# Patient Record
Sex: FEMALE | Race: WHITE | HISPANIC OR LATINO | Employment: FULL TIME | URBAN - METROPOLITAN AREA
[De-identification: names, ages, dates, MRNs, and addresses within clinical notes are randomized per-mention and may not be internally consistent; named-entity substitution may affect disease eponyms.]

---

## 2017-01-18 ENCOUNTER — OFFICE VISIT (OUTPATIENT)
Dept: FAMILY MEDICINE CLINIC | Facility: CLINIC | Age: 52
End: 2017-01-18

## 2017-01-18 VITALS
OXYGEN SATURATION: 98 % | SYSTOLIC BLOOD PRESSURE: 118 MMHG | TEMPERATURE: 98.2 F | HEIGHT: 59 IN | WEIGHT: 139.1 LBS | HEART RATE: 73 BPM | RESPIRATION RATE: 16 BRPM | DIASTOLIC BLOOD PRESSURE: 72 MMHG | BODY MASS INDEX: 28.04 KG/M2

## 2017-01-18 DIAGNOSIS — K52.9 GASTROENTERITIS: ICD-10-CM

## 2017-01-18 DIAGNOSIS — R19.7 DIARRHEA, UNSPECIFIED TYPE: Primary | ICD-10-CM

## 2017-01-18 DIAGNOSIS — N76.1 SUBACUTE VAGINITIS: ICD-10-CM

## 2017-01-18 LAB
BILIRUB BLD-MCNC: NEGATIVE MG/DL
CLARITY, POC: CLEAR
COLOR UR: YELLOW
GLUCOSE UR STRIP-MCNC: NEGATIVE MG/DL
KETONES UR QL: NEGATIVE
LEUKOCYTE EST, POC: NEGATIVE
NITRITE UR-MCNC: NEGATIVE MG/ML
PH UR: 5 [PH] (ref 5–8)
PROT UR STRIP-MCNC: NEGATIVE MG/DL
RBC # UR STRIP: NEGATIVE /UL
SP GR UR: 1.03 (ref 1–1.03)
UROBILINOGEN UR QL: NORMAL

## 2017-01-18 PROCEDURE — 81003 URINALYSIS AUTO W/O SCOPE: CPT | Performed by: FAMILY MEDICINE

## 2017-01-18 PROCEDURE — 99214 OFFICE O/P EST MOD 30 MIN: CPT | Performed by: FAMILY MEDICINE

## 2017-01-18 NOTE — MR AVS SNAPSHOT
Khushboo Freeman   2017 1:30 PM   Office Visit    Dept Phone:  [phone]   Encounter #:  [id number]    Provider:  Ernesto Barcenas MD   Department:  [facility]IMARY [facility]                Your Full Care Plan              Your Updated Medication List          This list is accurate as of: 17  1:56 PM.  Always use your most recent med list.                estradiol 0.1 MG/GM vaginal cream   Commonly known as:  ESTRACE   Insert 2 g into the vagina daily. 1/2 gram every other day               We Performed the Following     Ambulatory Referral to Gastroenterology     Ambulatory Referral to Obstetrics / Gynecology     Clostridium Difficile EIA     Giardia / Cryptosporidium Screen     POC Urinalysis Dipstick, Automated       You Were Diagnosed With        Codes Comments    Diarrhea, unspecified type    -  Primary ICD-10-CM: R19.7  ICD-9-CM: 787.91     Subacute vaginitis     ICD-10-CM: N76.1  ICD-9-CM: 616.10     Gastroenteritis     ICD-10-CM: K52.9  ICD-9-CM: 558.9       Instructions     None    Patient Instructions History      Upcoming Appointments     Visit Type Date Time Department    SAME DAY 2017  1:30 PM [facility] Signup     [facility] [employer] allows you to send messages to your doctor, view your test results, renew your prescriptions, schedule appointments, and more. To sign up, go to [facility] and click on the Sign Up Now link in the New User? box. Enter your [employer] Activation Code exactly as it appears below along with the last four digits of your Social Security Number and your Date of Birth () to complete the sign-up process. If you do not sign up before the expiration date, you must request a new code.    [employer] Activation Code: [email or web address]  Expires: 2017  1:54 PM    If you have questions, you can email [email or web address] or call [phone] to talk to our [employer] staff. Remember,  "MyChart is NOT to be used for urgent needs. For medical emergencies, dial 911.               Other Info from Your Visit           Allergies     Oxycodone-acetaminophen  Hives, Itching      Reason for Visit     Diarrhea     Headache     Vomiting           Vital Signs     Blood Pressure Pulse Temperature Respirations Height Weight    118/72 73 98.2 °F (36.8 °C) (Oral) 16 59\" (149.9 cm) 139 lb 1.6 oz (63.1 kg)    Last Menstrual Period Oxygen Saturation Body Mass Index Smoking Status          (LMP Unknown) 98% 28.09 kg/m2 Never Smoker        Problems and Diagnoses Noted     Diarrhea    -  Primary    Subacute vaginitis        Gastroenteritis          Results     POC Urinalysis Dipstick, Automated      Component Value Standard Range & Units    Color Yellow Yellow, Straw, Dark Yellow, Kiana    Clarity, UA Clear Clear    Glucose, UA Negative Negative, 1000 mg/dL (3+) mg/dL    Bilirubin Negative Negative    Ketones, UA Negative Negative    Specific Gravity  1.030 1.005 - 1.030    Blood, UA Negative Negative    pH, Urine 5.0 5.0 - 8.0    Protein, POC Negative Negative mg/dL    Urobilinogen, UA Normal Normal    Leukocytes Negative Negative    Nitrite, UA Negative Negative                    "

## 2017-01-18 NOTE — PROGRESS NOTES
"Subjective   Khushboo Freeman is a 51 y.o. female.     Chief Complaint   Patient presents with   • Diarrhea   • Headache   • Vomiting        History of Present Illness    This week headache, nausea vomiting, and diarrhea.  Little bit better today.  Questionable mucus and possibly blood in the stool.  However she's had intermittent diarrhea for the better part of 6 months.  She went to [place] this summer.  Came back with diarrhea.  She had a negative stool culture negative stool test for ova and parasite.  Then she went to [place].  She's had intermittent loose stools since then.  Worse last week or 2.  In December she went to her clinic and had Augmentin for a sinus infection.  The causing diarrhea also.  The diarrhea is bothersome occurs frequently and has a foul smell.    She's also complaining of increased stress level.  Related to stressors at work.  She also has some right lower back pain.  She is wondering if she has a kidney infection.  She's had some darker urine but no dysuria.  No urinary frequency or burning.  No recent fever.  Headache is better.      The following portions of the patient's history were reviewed and updated as appropriate: allergies, current medications, past family history, past medical history, past social history, past surgical history and problem list.          Review of Systems   Constitutional: Positive for fatigue. Negative for fever.   Respiratory: Negative.    Cardiovascular: Negative.    Gastrointestinal: Positive for blood in stool, constipation ( Intermittent constipation in the past), diarrhea, nausea and vomiting.   Genitourinary: Negative for difficulty urinating, dysuria, flank pain and hematuria.   Musculoskeletal: Positive for back pain.   Neurological: Positive for headaches.   Psychiatric/Behavioral: The patient is nervous/anxious.        Objective   Blood pressure 118/72, pulse 73, temperature 98.2 °F (36.8 °C), temperature source Oral, resp. rate 16, height 59\" " (149.9 cm), weight 139 lb 1.6 oz (63.1 kg), SpO2 98 %, not currently breastfeeding.  Physical Exam   Constitutional: She appears well-developed and well-nourished. No distress.   Eyes: Conjunctivae are normal. No scleral icterus.   Neck: Normal range of motion. Neck supple. No thyromegaly present.   Cardiovascular: Normal rate, regular rhythm, normal heart sounds and intact distal pulses.    Pulmonary/Chest: Effort normal and breath sounds normal.   Abdominal: Soft. Bowel sounds are normal. She exhibits no distension and no mass. There is tenderness (minimal suprapubic tenderness). There is no rebound and no guarding. No hernia.   Musculoskeletal: Normal range of motion. She exhibits no edema.   Skin: Skin is warm and dry.   Psychiatric: She has a normal mood and affect. Her behavior is normal. Judgment and thought content normal.   Nursing note and vitals reviewed.          Assessment/Plan   Khushboo was seen today for diarrhea, headache and vomiting.    Diagnoses and all orders for this visit:    Diarrhea, unspecified type  -     POC Urinalysis Dipstick, Automated  -     Ambulatory Referral to Gastroenterology  -     Clostridium Difficile EIA  -     Giardia / Cryptosporidium Screen    Subacute vaginitis  -     POC Urinalysis Dipstick, Automated  -     Ambulatory Referral to Obstetrics / Gynecology  -     Clostridium Difficile EIA    Gastroenteritis    Chronic diarrhea with acute exacerbation.  High probability of irritable bowel syndrome with acute viral gastroenteritis.  However other causes need to be excluded before this diagnosis can be made.      She's had loose stool symptoms since traveling to [place] this summer.  When she first got back from [place] the stool culture and O&P test was negative.  But then she went to [place].  Most recently exacerbation of the symptoms, but more suggestive of viral gastroenteritis.  Given the recent antibiotic use for a reported sinus infection, I'm recommending stool  for C. difficile.  Also checking stool for Giardia and cryptosporidium although less likely.  Urinalysis today was unremarkable.  I'm referring her to to a gastroenterologist.  She had a previous appointment that she had canceled.  She is overdue for colonoscopy, last was 7 years ago.  She will see her primary care doctor for follow-up as needed.    Intermittent vaginitis-like symptoms.  History of hysterectomy.  Patient like referral to gynecologist.  This was made.

## 2017-01-25 LAB
C DIFF TOX A+B STL QL IA: NEGATIVE
CRYPTOSP AG STL QL IA: NEGATIVE
G LAMBLIA AG STL QL IA: NEGATIVE

## 2017-02-03 ENCOUNTER — OFFICE VISIT (OUTPATIENT)
Dept: GASTROENTEROLOGY | Facility: CLINIC | Age: 52
End: 2017-02-03

## 2017-02-03 VITALS
DIASTOLIC BLOOD PRESSURE: 72 MMHG | SYSTOLIC BLOOD PRESSURE: 118 MMHG | WEIGHT: 136 LBS | HEIGHT: 60 IN | BODY MASS INDEX: 26.7 KG/M2

## 2017-02-03 DIAGNOSIS — K59.1 FUNCTIONAL DIARRHEA: Primary | ICD-10-CM

## 2017-02-03 DIAGNOSIS — R14.0 BLOATING: ICD-10-CM

## 2017-02-03 DIAGNOSIS — R10.84 GENERALIZED ABDOMINAL PAIN: ICD-10-CM

## 2017-02-03 PROCEDURE — 99204 OFFICE O/P NEW MOD 45 MIN: CPT | Performed by: INTERNAL MEDICINE

## 2017-02-03 RX ORDER — SODIUM CHLORIDE 0.9 % (FLUSH) 0.9 %
1-10 SYRINGE (ML) INJECTION AS NEEDED
Status: CANCELLED | OUTPATIENT
Start: 2017-02-03

## 2017-02-03 RX ORDER — SODIUM CHLORIDE, SODIUM LACTATE, POTASSIUM CHLORIDE, CALCIUM CHLORIDE 600; 310; 30; 20 MG/100ML; MG/100ML; MG/100ML; MG/100ML
30 INJECTION, SOLUTION INTRAVENOUS CONTINUOUS
Status: CANCELLED | OUTPATIENT
Start: 2017-02-03

## 2017-02-03 RX ORDER — HYOSCYAMINE SULFATE 0.125 MG
0.12 TABLET ORAL EVERY 6 HOURS PRN
Qty: 120 TABLET | Refills: 2 | Status: SHIPPED | OUTPATIENT
Start: 2017-02-03 | End: 2017-03-24 | Stop reason: ALTCHOICE

## 2017-02-03 NOTE — PROGRESS NOTES
Chief Complaint   Patient presents with   • Diarrhea       Subjective     HPI    Khushboo Freeman is a 51 y.o. female with a past medical history noted below who presents for evaluation of diarrhea.  Symptoms for a number of years but worsening for about a year and a half.  At first, was intermittent with diarrhea and constipation.  She would control symptoms by eating dairy (lactose intolerant) for constipation and taking iron pills to stop her up for diarrhea.  She thinks that symptoms started following a kidney infection.  Symptoms have progressed and now she describes diarrhea ocurring about 4 days per week.  The other days she has one formed stool per day.  On the diarrhea days she will have about 3-5 stools.  She will have urgency and cramping along with pressure in her R flank.  She has adjusted her diet extensively but has found no triggers.  In fact her mother is in town and has been doing most of the cooking, which has been more healthy, and she has had no change in her symptoms.  In the morning, if she doesn't eat in the morning or eats oily and greasy food she will get severe abdominal pain, cramping, located in the bilateral lower quadrants, doubling her over.  She also endorses frequent bloating symptoms.  She does endorse that stressful situations make her diarrhea worse.    She has no nausea or vomiting.  Sour taste in her mouth regularly.  Review of past notes indicates normal stool studies.       She is s/p cholecystectomy.      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      Colonoscopy 2005-- for problems with her GI tract, she thinks for a different issue.  Reviewed pathology-- normal colonic biopsies, gastritis but negative H pylori.    Paternal uncle with colon cancer.  No other GI malignancies in the family.  No smoking and no drinking.  Works for [employer] in the [employer].      Past Medical History   Diagnosis Date   • Anemia    • Fecal incontinence    • Kidney  infection 2013     Hospitalized   • Uterine fibroid          Current Outpatient Prescriptions:   •  estradiol (ESTRACE) 0.1 MG/GM vaginal cream, Insert 2 g into the vagina daily. 1/2 gram every other day, Disp: 42 g, Rfl: 12  •  IRON, FERROUS GLUCONATE, PO, Take  by mouth., Disp: , Rfl:   •  hyoscyamine (ANASPAZ,LEVSIN) 0.125 MG tablet, Take 1 tablet by mouth Every 6 (Six) Hours As Needed for cramping or diarrhea., Disp: 120 tablet, Rfl: 2    Allergies   Allergen Reactions   • Oxycodone-Acetaminophen Hives and Itching       Social History     Social History   • Marital status:      Spouse name: N/A   • Number of children: 3   • Years of education: N/A     Occupational History   • instructor      Social History Main Topics   • Smoking status: Never Smoker   • Smokeless tobacco: Never Used   • Alcohol use No   • Drug use: No   • Sexual activity: Yes     Partners: Male     Birth control/ protection: None     Other Topics Concern   • Not on file     Social History Narrative    OB/GYN patient since 2009       Family History   Problem Relation Age of Onset   • Hypertension Mother    • Thyroid disease Mother    • Osteoporosis Mother    • Cancer Father      kidney   • Colon cancer Paternal Uncle        Review of Systems   Constitutional: Negative for activity change, appetite change and fatigue.   HENT: Negative for sore throat and trouble swallowing.    Eyes: Negative.    Respiratory: Negative.    Cardiovascular: Negative.    Gastrointestinal: Positive for diarrhea. Negative for abdominal distention, abdominal pain, blood in stool, nausea, rectal pain and vomiting.   Endocrine: Negative for cold intolerance and heat intolerance.   Genitourinary: Negative for difficulty urinating, dysuria and frequency.        Frequent yeast infections   Musculoskeletal: Negative for arthralgias, back pain and myalgias.   Skin: Negative.    Hematological: Negative for adenopathy. Does not bruise/bleed easily.    Psychiatric/Behavioral: The patient is nervous/anxious.    All other systems reviewed and are negative.      Objective     Vitals:    02/03/17 1435   BP: 118/72       Physical Exam   Constitutional: She is oriented to person, place, and time. She appears well-developed and well-nourished. No distress.   HENT:   Head: Normocephalic and atraumatic.   Right Ear: External ear normal.   Left Ear: External ear normal.   Nose: Nose normal.   Mouth/Throat: Oropharynx is clear and moist.   Eyes: Conjunctivae and EOM are normal. Right eye exhibits no discharge. Left eye exhibits no discharge. No scleral icterus.   Neck: Normal range of motion. Neck supple. No thyromegaly present.   No supraclavicular adenopathy   Cardiovascular: Normal rate, regular rhythm, normal heart sounds and intact distal pulses.  Exam reveals no gallop.    No murmur heard.  No lower extremity edema   Pulmonary/Chest: Effort normal and breath sounds normal. No stridor. No respiratory distress. She has no wheezes.   Abdominal: Soft. Normal appearance and bowel sounds are normal. She exhibits no distension and no mass. There is no hepatosplenomegaly. There is tenderness. There is no rigidity, no rebound and no guarding. No hernia.   Diffusely tender to palpation   Genitourinary:   Genitourinary Comments: Rectal exam deferred   Musculoskeletal: Normal range of motion. She exhibits no edema or tenderness.   No atrophy of upper or lower extremities.  Normal digits and nails of both hands.   Lymphadenopathy:     She has no cervical adenopathy.   Neurological: She is alert and oriented to person, place, and time. She displays no atrophy. Coordination normal.   Skin: Skin is warm and dry. No rash noted. She is not diaphoretic. No erythema.   Psychiatric: She has a normal mood and affect. Her behavior is normal. Judgment and thought content normal.   Nursing note and vitals reviewed.      WBC   Date Value Ref Range Status   08/03/2015 3.26 (L) 4.50 - 10.70  K/Cumm Final     RBC   Date Value Ref Range Status   08/03/2015 4.92 3.90 - 5.20 Million Final     HEMOGLOBIN   Date Value Ref Range Status   08/03/2015 15.3 11.9 - 15.5 g/dL Final     HEMATOCRIT   Date Value Ref Range Status   08/03/2015 44.6 35.6 - 45.5 % Final     MCV   Date Value Ref Range Status   08/03/2015 90.7 80.5 - 98.2 fL Final     MCH   Date Value Ref Range Status   08/03/2015 31.1 26.9 - 32.0 pg Final     MCHC   Date Value Ref Range Status   08/03/2015 34.3 32.4 - 36.3 g/dL Final     RDW   Date Value Ref Range Status   08/03/2015 12.4 11.7 - 13.0 % Final     PLATELETS   Date Value Ref Range Status   08/03/2015 148 140 - 500 K/Cumm Final     NEUTROPHIL REL %   Date Value Ref Range Status   08/03/2015 83.4 (H) 42.7 - 76.0 % Final     LYMPHOCYTE REL %   Date Value Ref Range Status   08/03/2015 12.9 (L) 19.6 - 45.3 % Final     MONOCYTE REL %   Date Value Ref Range Status   08/03/2015 3.4 (L) 5.0 - 12.0 % Final     EOSINOPHIL REL %   Date Value Ref Range Status   08/03/2015 0.0 (L) 0.3 - 6.2 % Final     BASOPHIL REL %   Date Value Ref Range Status   08/03/2015 0.3 0.0 - 1.5 % Final     NEUTROPHILS ABSOLUTE   Date Value Ref Range Status   08/03/2015 2.7 1.9 - 8.1 K/Cumm Final     LYMPHOCYTES ABSOLUTE   Date Value Ref Range Status   08/03/2015 0.4 (L) 0.9 - 4.8 K/Cumm Final     MONOCYTES ABSOLUTE   Date Value Ref Range Status   08/03/2015 0.1 (L) 0.2 - 1.2 K/Cumm Final     EOSINOPHILS ABSOLUTE   Date Value Ref Range Status   08/03/2015 0.0 0.0 - 0.7 K/Cumm Final     BASOPHILS ABSOLUTE   Date Value Ref Range Status   08/03/2015 0.0 0.0 - 0.2 K/Cumm Final       GLUCOSE   Date Value Ref Range Status   08/03/2015 119 (H) 65 - 99 mg/dL Final     SODIUM   Date Value Ref Range Status   08/03/2015 139 136 - 145 mmol/L Final     POTASSIUM   Date Value Ref Range Status   08/03/2015 3.4 (L) 3.5 - 5.2 mmol/L Final     CO2   Date Value Ref Range Status   08/03/2015 28 22 - 29 mmol/L Final     CHLORIDE   Date Value Ref  Range Status   08/03/2015 97 (L) 98 - 107 mmol/L Final     CREATININE   Date Value Ref Range Status   08/03/2015 0.69 0.57 - 1.00 mg/dL Final     BUN   Date Value Ref Range Status   08/03/2015 9 6 - 20 mg/dL Final     CALCIUM   Date Value Ref Range Status   08/03/2015 10.0 8.6 - 10.5 mg/dL Final     ALKALINE PHOSPHATASE   Date Value Ref Range Status   08/03/2015 151 (H) 39 - 117 U/L Final     TOTAL PROTEIN   Date Value Ref Range Status   08/03/2015 8.3 6.0 - 8.5 g/dL Final     ALT (SGPT)   Date Value Ref Range Status   08/03/2015 109 (H) 5 - 33 U/L Final     AST (SGOT)   Date Value Ref Range Status   08/03/2015 137 (H) 5 - 32 U/L Final     TOTAL BILIRUBIN   Date Value Ref Range Status   08/03/2015 1.0 0.1 - 1.2 mg/dL Final     ALBUMIN   Date Value Ref Range Status   08/03/2015 4.7 3.5 - 5.2 g/dL Final         Imaging Results (last 7 days)     ** No results found for the last 168 hours. **            No notes on file    Assessment/Plan      1)diarrhea: acute on chronic problem for her.  She has had chronic issues for a number of years suggestive of irritable bowel.  However she does have intermittent flares of worsening symptoms and is currently in the middle of a long-standing flare of symptoms.  She did have a colonoscopy in 2005 with normal biopsies.  Stool studies for an infectious cause by her primary care physician were negative    2)abdominal pain: another chronic issue for her.  It is associated with the diarrhea.  She notes that it is worse with stressful situations    3)bloating: associated with above    Plan:  Overall, her constellation of symptoms are suggestive of a long-standing bowel syndrome however she does endorse recent worsening of her symptoms.  Given that she is age 51 and her last colonoscopy was approximately 11 years ago she certainly is due for colonoscopy for colorectal cancer screening.    -to schedule colonoscopy for further evaluation of diarrhea and also for colorectal cancer  screening  -Trial of Levsin for pain and diarrhea  -I will check H. Pylori antibodies as well as a CBC and CMP on her today as it appears that she has not had recent labs and she does have a history of anemia    Khushboo was seen today for diarrhea.    Diagnoses and all orders for this visit:    Functional diarrhea  -     Case Request; Standing  -     Implement Anesthesia Orders Day of Procedure; Standing  -     Obtain Informed Consent; Standing  -     Verify bowel prep was successful; Standing  -     Insert Peripheral IV; Standing  -     Saline Lock & Maintain IV Access; Standing  -     sodium chloride 0.9 % flush 1-10 mL; Infuse 1-10 mL into a venous catheter As Needed for line care.  -     lactated ringers infusion; Infuse 30 mL/hr into a venous catheter Continuous.  -     Case Request  -     hyoscyamine (ANASPAZ,LEVSIN) 0.125 MG tablet; Take 1 tablet by mouth Every 6 (Six) Hours As Needed for cramping or diarrhea.  -     H. Pylori Antibody, IgA  -     H. Pylori Antibody, IgG  -     CBC & Differential  -     Comprehensive Metabolic Panel    Generalized abdominal pain  -     Case Request; Standing  -     Implement Anesthesia Orders Day of Procedure; Standing  -     Obtain Informed Consent; Standing  -     Verify bowel prep was successful; Standing  -     Insert Peripheral IV; Standing  -     Saline Lock & Maintain IV Access; Standing  -     sodium chloride 0.9 % flush 1-10 mL; Infuse 1-10 mL into a venous catheter As Needed for line care.  -     lactated ringers infusion; Infuse 30 mL/hr into a venous catheter Continuous.  -     Case Request  -     hyoscyamine (ANASPAZ,LEVSIN) 0.125 MG tablet; Take 1 tablet by mouth Every 6 (Six) Hours As Needed for cramping or diarrhea.  -     H. Pylori Antibody, IgA  -     H. Pylori Antibody, IgG  -     CBC & Differential  -     Comprehensive Metabolic Panel    Bloating        I have discussed the above plan with the patient.  They verbalize understanding and are in agreement  with the plan.  They have been advised to contact the office for any questions, concerns, or changes related to their health.    EMR Dragon/Transcription disclaimer:       Much of this encounter note is an electronic transcription/translation of spoken language to printed text. The electronic translation of spoken language may permit erroneous, or at times, nonsensical words or phrases to be inadvertently transcribed; Although I have reviewed the note for such errors, some may still exist.

## 2017-02-03 NOTE — PATIENT INSTRUCTIONS
Labs today, will call with results    Schedule the colonoscopy    Start the levsin to control the diarrhea; take as needed    For any additional questions, concerns or changes to your condition after today's office visit please contact the office at [phone].

## 2017-02-07 ENCOUNTER — TELEPHONE (OUTPATIENT)
Dept: GASTROENTEROLOGY | Facility: CLINIC | Age: 52
End: 2017-02-07

## 2017-02-07 DIAGNOSIS — R74.8 ELEVATED LIVER ENZYMES: Primary | ICD-10-CM

## 2017-02-07 LAB
ALBUMIN SERPL-MCNC: 4.8 G/DL (ref 3.5–5.2)
ALBUMIN/GLOB SERPL: 1.6 G/DL
ALP SERPL-CCNC: 135 U/L (ref 39–117)
ALT SERPL-CCNC: 56 U/L (ref 1–33)
AST SERPL-CCNC: 51 U/L (ref 1–32)
BASOPHILS # BLD AUTO: 0.01 10*3/MM3 (ref 0–0.2)
BASOPHILS NFR BLD AUTO: 0.2 % (ref 0–1.5)
BILIRUB SERPL-MCNC: 0.5 MG/DL (ref 0.1–1.2)
BUN SERPL-MCNC: 16 MG/DL (ref 6–20)
BUN/CREAT SERPL: 23.5 (ref 7–25)
CALCIUM SERPL-MCNC: 9.9 MG/DL (ref 8.6–10.5)
CHLORIDE SERPL-SCNC: 102 MMOL/L (ref 98–107)
CO2 SERPL-SCNC: 27.2 MMOL/L (ref 22–29)
CREAT SERPL-MCNC: 0.68 MG/DL (ref 0.57–1)
EOSINOPHIL # BLD AUTO: 0.11 10*3/MM3 (ref 0–0.7)
EOSINOPHIL NFR BLD AUTO: 1.8 % (ref 0.3–6.2)
ERYTHROCYTE [DISTWIDTH] IN BLOOD BY AUTOMATED COUNT: 12.7 % (ref 11.7–13)
GLOBULIN SER CALC-MCNC: 3 GM/DL
GLUCOSE SERPL-MCNC: 96 MG/DL (ref 65–99)
H PYLORI IGA SER-ACNC: <9 UNITS (ref 0–8.9)
H PYLORI IGG SER IA-ACNC: <0.9 U/ML (ref 0–0.8)
HCT VFR BLD AUTO: 43.5 % (ref 35.6–45.5)
HGB BLD-MCNC: 14.3 G/DL (ref 11.9–15.5)
IMM GRANULOCYTES # BLD: 0 10*3/MM3 (ref 0–0.03)
IMM GRANULOCYTES NFR BLD: 0 % (ref 0–0.5)
LYMPHOCYTES # BLD AUTO: 2.38 10*3/MM3 (ref 0.9–4.8)
LYMPHOCYTES NFR BLD AUTO: 38.4 % (ref 19.6–45.3)
MCH RBC QN AUTO: 31.4 PG (ref 26.9–32)
MCHC RBC AUTO-ENTMCNC: 32.9 G/DL (ref 32.4–36.3)
MCV RBC AUTO: 95.6 FL (ref 80.5–98.2)
MONOCYTES # BLD AUTO: 0.37 10*3/MM3 (ref 0.2–1.2)
MONOCYTES NFR BLD AUTO: 6 % (ref 5–12)
NEUTROPHILS # BLD AUTO: 3.32 10*3/MM3 (ref 1.9–8.1)
NEUTROPHILS NFR BLD AUTO: 53.6 % (ref 42.7–76)
PLATELET # BLD AUTO: 222 10*3/MM3 (ref 140–500)
POTASSIUM SERPL-SCNC: 4.1 MMOL/L (ref 3.5–5.2)
PROT SERPL-MCNC: 7.8 G/DL (ref 6–8.5)
RBC # BLD AUTO: 4.55 10*6/MM3 (ref 3.9–5.2)
SODIUM SERPL-SCNC: 144 MMOL/L (ref 136–145)
WBC # BLD AUTO: 6.19 10*3/MM3 (ref 4.5–10.7)

## 2017-02-07 NOTE — PROGRESS NOTES
Her liver tests, ALP, ALT, and AST remain elevated.  I have ordered a liver ultrasound for further evaluation.  Please make sure she has follow up with me in April, thanks

## 2017-02-07 NOTE — TELEPHONE ENCOUNTER
----- Message from Jammie Collado MD sent at 2/7/2017 12:20 PM EST -----  Her liver tests, ALP, ALT, and AST remain elevated.  I have ordered a liver ultrasound for further evaluation.  Please make sure she has follow up with me in April, thanks

## 2017-02-07 NOTE — TELEPHONE ENCOUNTER
Called pt and advised per Dr Collado that her liver tests remain elevated.  She has ordered a liver u/s for further eval.  Advised her to f/u with Dr Collado in April.    Pt made f/u for 03/21/2017 at 230pm for Dr Collado.

## 2017-02-26 ENCOUNTER — HOSPITAL ENCOUNTER (OUTPATIENT)
Dept: ULTRASOUND IMAGING | Facility: HOSPITAL | Age: 52
Discharge: HOME | End: 2017-02-26
Attending: INTERNAL MEDICINE | Admitting: INTERNAL MEDICINE

## 2017-02-26 DIAGNOSIS — R74.8 ELEVATED LIVER ENZYMES: ICD-10-CM

## 2017-02-28 ENCOUNTER — TELEPHONE (OUTPATIENT)
Dept: GASTROENTEROLOGY | Facility: CLINIC | Age: 52
End: 2017-02-28

## 2017-02-28 NOTE — TELEPHONE ENCOUNTER
Call to pt.  Advise per Dr Collado that liver USN with evidence of fatty liver.  Pt asking what could cause this.  Advise that ETOH consumption, diet and exercise contributing factors.  Pt states does not drink.  Pt states will discuss further with Dr Collado tomorrow at c/s.

## 2017-02-28 NOTE — TELEPHONE ENCOUNTER
----- Message from Jammie Collado MD sent at 2/27/2017  8:55 PM EST -----  Liver USN with evidence of fatty liver

## 2017-03-01 ENCOUNTER — ANESTHESIA (OUTPATIENT)
Dept: GASTROENTEROLOGY | Facility: HOSPITAL | Age: 52
End: 2017-03-01

## 2017-03-01 ENCOUNTER — ANESTHESIA EVENT (OUTPATIENT)
Dept: GASTROENTEROLOGY | Facility: HOSPITAL | Age: 52
End: 2017-03-01

## 2017-03-01 ENCOUNTER — HOSPITAL ENCOUNTER (OUTPATIENT)
Facility: HOSPITAL | Age: 52
Setting detail: HOSPITAL OUTPATIENT SURGERY
Discharge: HOME | End: 2017-03-01
Attending: INTERNAL MEDICINE | Admitting: INTERNAL MEDICINE

## 2017-03-01 VITALS
OXYGEN SATURATION: 99 % | HEART RATE: 69 BPM | RESPIRATION RATE: 16 BRPM | WEIGHT: 132.13 LBS | HEIGHT: 60 IN | TEMPERATURE: 98.5 F | SYSTOLIC BLOOD PRESSURE: 116 MMHG | DIASTOLIC BLOOD PRESSURE: 40 MMHG | BODY MASS INDEX: 25.94 KG/M2

## 2017-03-01 DIAGNOSIS — K59.1 FUNCTIONAL DIARRHEA: ICD-10-CM

## 2017-03-01 DIAGNOSIS — R10.84 GENERALIZED ABDOMINAL PAIN: ICD-10-CM

## 2017-03-01 PROCEDURE — 88305 TISSUE EXAM BY PATHOLOGIST: CPT | Performed by: INTERNAL MEDICINE

## 2017-03-01 PROCEDURE — 45380 COLONOSCOPY AND BIOPSY: CPT | Performed by: INTERNAL MEDICINE

## 2017-03-01 PROCEDURE — 25010000002 PROPOFOL 10 MG/ML EMULSION: Performed by: ANESTHESIOLOGY

## 2017-03-01 RX ORDER — PROPOFOL 10 MG/ML
VIAL (ML) INTRAVENOUS AS NEEDED
Status: DISCONTINUED | OUTPATIENT
Start: 2017-03-01 | End: 2017-03-01 | Stop reason: SURG

## 2017-03-01 RX ORDER — PROPOFOL 10 MG/ML
VIAL (ML) INTRAVENOUS CONTINUOUS PRN
Status: DISCONTINUED | OUTPATIENT
Start: 2017-03-01 | End: 2017-03-01 | Stop reason: SURG

## 2017-03-01 RX ORDER — SODIUM CHLORIDE 0.9 % (FLUSH) 0.9 %
1-10 SYRINGE (ML) INJECTION AS NEEDED
Status: DISCONTINUED | OUTPATIENT
Start: 2017-03-01 | End: 2017-03-01 | Stop reason: HOSPADM

## 2017-03-01 RX ORDER — SODIUM CHLORIDE, SODIUM LACTATE, POTASSIUM CHLORIDE, CALCIUM CHLORIDE 600; 310; 30; 20 MG/100ML; MG/100ML; MG/100ML; MG/100ML
30 INJECTION, SOLUTION INTRAVENOUS CONTINUOUS
Status: DISCONTINUED | OUTPATIENT
Start: 2017-03-01 | End: 2017-03-01 | Stop reason: HOSPADM

## 2017-03-01 RX ORDER — LIDOCAINE HYDROCHLORIDE 20 MG/ML
INJECTION, SOLUTION INFILTRATION; PERINEURAL AS NEEDED
Status: DISCONTINUED | OUTPATIENT
Start: 2017-03-01 | End: 2017-03-01 | Stop reason: SURG

## 2017-03-01 RX ADMIN — PROPOFOL 125 MCG/KG/MIN: 10 INJECTION, EMULSION INTRAVENOUS at 09:03

## 2017-03-01 RX ADMIN — SODIUM CHLORIDE, POTASSIUM CHLORIDE, SODIUM LACTATE AND CALCIUM CHLORIDE 30 ML/HR: 600; 310; 30; 20 INJECTION, SOLUTION INTRAVENOUS at 08:50

## 2017-03-01 RX ADMIN — LIDOCAINE HYDROCHLORIDE 50 MG: 20 INJECTION, SOLUTION INFILTRATION; PERINEURAL at 09:01

## 2017-03-01 RX ADMIN — PROPOFOL 75 MG: 10 INJECTION, EMULSION INTRAVENOUS at 09:02

## 2017-03-01 NOTE — H&P (VIEW-ONLY)
Chief Complaint   Patient presents with   • Diarrhea       Subjective     HPI    Khushboo Freeman is a 51 y.o. female with a past medical history noted below who presents for evaluation of diarrhea.  Symptoms for a number of years but worsening for about a year and a half.  At first, was intermittent with diarrhea and constipation.  She would control symptoms by eating dairy (lactose intolerant) for constipation and taking iron pills to stop her up for diarrhea.  She thinks that symptoms started following a kidney infection.  Symptoms have progressed and now she describes diarrhea ocurring about 4 days per week.  The other days she has one formed stool per day.  On the diarrhea days she will have about 3-5 stools.  She will have urgency and cramping along with pressure in her R flank.  She has adjusted her diet extensively but has found no triggers.  In fact her mother is in town and has been doing most of the cooking, which has been more healthy, and she has had no change in her symptoms.  In the morning, if she doesn't eat in the morning or eats oily and greasy food she will get severe abdominal pain, cramping, located in the bilateral lower quadrants, doubling her over.  She also endorses frequent bloating symptoms.  She does endorse that stressful situations make her diarrhea worse.    She has no nausea or vomiting.  Sour taste in her mouth regularly.  Review of past notes indicates normal stool studies.       She is s/p cholecystectomy.      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      Colonoscopy 2005-- for problems with her GI tract, she thinks for a different issue.  Reviewed pathology-- normal colonic biopsies, gastritis but negative H pylori.    Paternal uncle with colon cancer.  No other GI malignancies in the family.  No smoking and no drinking.  Works for [employer] in the [employer].      Past Medical History   Diagnosis Date   • Anemia    • Fecal incontinence    • Kidney  infection 2013     Hospitalized   • Uterine fibroid          Current Outpatient Prescriptions:   •  estradiol (ESTRACE) 0.1 MG/GM vaginal cream, Insert 2 g into the vagina daily. 1/2 gram every other day, Disp: 42 g, Rfl: 12  •  IRON, FERROUS GLUCONATE, PO, Take  by mouth., Disp: , Rfl:   •  hyoscyamine (ANASPAZ,LEVSIN) 0.125 MG tablet, Take 1 tablet by mouth Every 6 (Six) Hours As Needed for cramping or diarrhea., Disp: 120 tablet, Rfl: 2    Allergies   Allergen Reactions   • Oxycodone-Acetaminophen Hives and Itching       Social History     Social History   • Marital status:      Spouse name: N/A   • Number of children: 3   • Years of education: N/A     Occupational History   • instructor      Social History Main Topics   • Smoking status: Never Smoker   • Smokeless tobacco: Never Used   • Alcohol use No   • Drug use: No   • Sexual activity: Yes     Partners: Male     Birth control/ protection: None     Other Topics Concern   • Not on file     Social History Narrative    OB/GYN patient since 2009       Family History   Problem Relation Age of Onset   • Hypertension Mother    • Thyroid disease Mother    • Osteoporosis Mother    • Cancer Father      kidney   • Colon cancer Paternal Uncle        Review of Systems   Constitutional: Negative for activity change, appetite change and fatigue.   HENT: Negative for sore throat and trouble swallowing.    Eyes: Negative.    Respiratory: Negative.    Cardiovascular: Negative.    Gastrointestinal: Positive for diarrhea. Negative for abdominal distention, abdominal pain, blood in stool, nausea, rectal pain and vomiting.   Endocrine: Negative for cold intolerance and heat intolerance.   Genitourinary: Negative for difficulty urinating, dysuria and frequency.        Frequent yeast infections   Musculoskeletal: Negative for arthralgias, back pain and myalgias.   Skin: Negative.    Hematological: Negative for adenopathy. Does not bruise/bleed easily.    Psychiatric/Behavioral: The patient is nervous/anxious.    All other systems reviewed and are negative.      Objective     Vitals:    02/03/17 1435   BP: 118/72       Physical Exam   Constitutional: She is oriented to person, place, and time. She appears well-developed and well-nourished. No distress.   HENT:   Head: Normocephalic and atraumatic.   Right Ear: External ear normal.   Left Ear: External ear normal.   Nose: Nose normal.   Mouth/Throat: Oropharynx is clear and moist.   Eyes: Conjunctivae and EOM are normal. Right eye exhibits no discharge. Left eye exhibits no discharge. No scleral icterus.   Neck: Normal range of motion. Neck supple. No thyromegaly present.   No supraclavicular adenopathy   Cardiovascular: Normal rate, regular rhythm, normal heart sounds and intact distal pulses.  Exam reveals no gallop.    No murmur heard.  No lower extremity edema   Pulmonary/Chest: Effort normal and breath sounds normal. No stridor. No respiratory distress. She has no wheezes.   Abdominal: Soft. Normal appearance and bowel sounds are normal. She exhibits no distension and no mass. There is no hepatosplenomegaly. There is tenderness. There is no rigidity, no rebound and no guarding. No hernia.   Diffusely tender to palpation   Genitourinary:   Genitourinary Comments: Rectal exam deferred   Musculoskeletal: Normal range of motion. She exhibits no edema or tenderness.   No atrophy of upper or lower extremities.  Normal digits and nails of both hands.   Lymphadenopathy:     She has no cervical adenopathy.   Neurological: She is alert and oriented to person, place, and time. She displays no atrophy. Coordination normal.   Skin: Skin is warm and dry. No rash noted. She is not diaphoretic. No erythema.   Psychiatric: She has a normal mood and affect. Her behavior is normal. Judgment and thought content normal.   Nursing note and vitals reviewed.      WBC   Date Value Ref Range Status   08/03/2015 3.26 (L) 4.50 - 10.70  K/Cumm Final     RBC   Date Value Ref Range Status   08/03/2015 4.92 3.90 - 5.20 Million Final     HEMOGLOBIN   Date Value Ref Range Status   08/03/2015 15.3 11.9 - 15.5 g/dL Final     HEMATOCRIT   Date Value Ref Range Status   08/03/2015 44.6 35.6 - 45.5 % Final     MCV   Date Value Ref Range Status   08/03/2015 90.7 80.5 - 98.2 fL Final     MCH   Date Value Ref Range Status   08/03/2015 31.1 26.9 - 32.0 pg Final     MCHC   Date Value Ref Range Status   08/03/2015 34.3 32.4 - 36.3 g/dL Final     RDW   Date Value Ref Range Status   08/03/2015 12.4 11.7 - 13.0 % Final     PLATELETS   Date Value Ref Range Status   08/03/2015 148 140 - 500 K/Cumm Final     NEUTROPHIL REL %   Date Value Ref Range Status   08/03/2015 83.4 (H) 42.7 - 76.0 % Final     LYMPHOCYTE REL %   Date Value Ref Range Status   08/03/2015 12.9 (L) 19.6 - 45.3 % Final     MONOCYTE REL %   Date Value Ref Range Status   08/03/2015 3.4 (L) 5.0 - 12.0 % Final     EOSINOPHIL REL %   Date Value Ref Range Status   08/03/2015 0.0 (L) 0.3 - 6.2 % Final     BASOPHIL REL %   Date Value Ref Range Status   08/03/2015 0.3 0.0 - 1.5 % Final     NEUTROPHILS ABSOLUTE   Date Value Ref Range Status   08/03/2015 2.7 1.9 - 8.1 K/Cumm Final     LYMPHOCYTES ABSOLUTE   Date Value Ref Range Status   08/03/2015 0.4 (L) 0.9 - 4.8 K/Cumm Final     MONOCYTES ABSOLUTE   Date Value Ref Range Status   08/03/2015 0.1 (L) 0.2 - 1.2 K/Cumm Final     EOSINOPHILS ABSOLUTE   Date Value Ref Range Status   08/03/2015 0.0 0.0 - 0.7 K/Cumm Final     BASOPHILS ABSOLUTE   Date Value Ref Range Status   08/03/2015 0.0 0.0 - 0.2 K/Cumm Final       GLUCOSE   Date Value Ref Range Status   08/03/2015 119 (H) 65 - 99 mg/dL Final     SODIUM   Date Value Ref Range Status   08/03/2015 139 136 - 145 mmol/L Final     POTASSIUM   Date Value Ref Range Status   08/03/2015 3.4 (L) 3.5 - 5.2 mmol/L Final     CO2   Date Value Ref Range Status   08/03/2015 28 22 - 29 mmol/L Final     CHLORIDE   Date Value Ref  Range Status   08/03/2015 97 (L) 98 - 107 mmol/L Final     CREATININE   Date Value Ref Range Status   08/03/2015 0.69 0.57 - 1.00 mg/dL Final     BUN   Date Value Ref Range Status   08/03/2015 9 6 - 20 mg/dL Final     CALCIUM   Date Value Ref Range Status   08/03/2015 10.0 8.6 - 10.5 mg/dL Final     ALKALINE PHOSPHATASE   Date Value Ref Range Status   08/03/2015 151 (H) 39 - 117 U/L Final     TOTAL PROTEIN   Date Value Ref Range Status   08/03/2015 8.3 6.0 - 8.5 g/dL Final     ALT (SGPT)   Date Value Ref Range Status   08/03/2015 109 (H) 5 - 33 U/L Final     AST (SGOT)   Date Value Ref Range Status   08/03/2015 137 (H) 5 - 32 U/L Final     TOTAL BILIRUBIN   Date Value Ref Range Status   08/03/2015 1.0 0.1 - 1.2 mg/dL Final     ALBUMIN   Date Value Ref Range Status   08/03/2015 4.7 3.5 - 5.2 g/dL Final         Imaging Results (last 7 days)     ** No results found for the last 168 hours. **            No notes on file    Assessment/Plan      1)diarrhea: acute on chronic problem for her.  She has had chronic issues for a number of years suggestive of irritable bowel.  However she does have intermittent flares of worsening symptoms and is currently in the middle of a long-standing flare of symptoms.  She did have a colonoscopy in 2005 with normal biopsies.  Stool studies for an infectious cause by her primary care physician were negative    2)abdominal pain: another chronic issue for her.  It is associated with the diarrhea.  She notes that it is worse with stressful situations    3)bloating: associated with above    Plan:  Overall, her constellation of symptoms are suggestive of a long-standing bowel syndrome however she does endorse recent worsening of her symptoms.  Given that she is age 51 and her last colonoscopy was approximately 11 years ago she certainly is due for colonoscopy for colorectal cancer screening.    -to schedule colonoscopy for further evaluation of diarrhea and also for colorectal cancer  screening  -Trial of Levsin for pain and diarrhea  -I will check H. Pylori antibodies as well as a CBC and CMP on her today as it appears that she has not had recent labs and she does have a history of anemia    Khushboo was seen today for diarrhea.    Diagnoses and all orders for this visit:    Functional diarrhea  -     Case Request; Standing  -     Implement Anesthesia Orders Day of Procedure; Standing  -     Obtain Informed Consent; Standing  -     Verify bowel prep was successful; Standing  -     Insert Peripheral IV; Standing  -     Saline Lock & Maintain IV Access; Standing  -     sodium chloride 0.9 % flush 1-10 mL; Infuse 1-10 mL into a venous catheter As Needed for line care.  -     lactated ringers infusion; Infuse 30 mL/hr into a venous catheter Continuous.  -     Case Request  -     hyoscyamine (ANASPAZ,LEVSIN) 0.125 MG tablet; Take 1 tablet by mouth Every 6 (Six) Hours As Needed for cramping or diarrhea.  -     H. Pylori Antibody, IgA  -     H. Pylori Antibody, IgG  -     CBC & Differential  -     Comprehensive Metabolic Panel    Generalized abdominal pain  -     Case Request; Standing  -     Implement Anesthesia Orders Day of Procedure; Standing  -     Obtain Informed Consent; Standing  -     Verify bowel prep was successful; Standing  -     Insert Peripheral IV; Standing  -     Saline Lock & Maintain IV Access; Standing  -     sodium chloride 0.9 % flush 1-10 mL; Infuse 1-10 mL into a venous catheter As Needed for line care.  -     lactated ringers infusion; Infuse 30 mL/hr into a venous catheter Continuous.  -     Case Request  -     hyoscyamine (ANASPAZ,LEVSIN) 0.125 MG tablet; Take 1 tablet by mouth Every 6 (Six) Hours As Needed for cramping or diarrhea.  -     H. Pylori Antibody, IgA  -     H. Pylori Antibody, IgG  -     CBC & Differential  -     Comprehensive Metabolic Panel    Bloating        I have discussed the above plan with the patient.  They verbalize understanding and are in agreement  with the plan.  They have been advised to contact the office for any questions, concerns, or changes related to their health.    EMR Dragon/Transcription disclaimer:       Much of this encounter note is an electronic transcription/translation of spoken language to printed text. The electronic translation of spoken language may permit erroneous, or at times, nonsensical words or phrases to be inadvertently transcribed; Although I have reviewed the note for such errors, some may still exist.

## 2017-03-01 NOTE — PLAN OF CARE
Problem: Patient Care Overview (Adult)  Goal: Plan of Care Review  Outcome: Ongoing (interventions implemented as appropriate)    03/01/17 0830   Coping/Psychosocial Response Interventions   Plan Of Care Reviewed With patient   Patient Care Overview   Progress improving       Goal: Adult Individualization and Mutuality  Outcome: Ongoing (interventions implemented as appropriate)    03/01/17 0830   Individualization   Patient Specific Preferences darcy       Goal: Discharge Needs Assessment    03/01/17 0830   Discharge Needs Assessment   Concerns To Be Addressed no discharge needs identified   Discharge Disposition home or self-care   Living Environment   Transportation Available car;family or friend will provide         Problem: GI Endoscopy (Adult)  Intervention: Monitor/Manage Procedure Recovery    03/01/17 0830   Respiratory Interventions   Airway/Ventilation Management airway patency maintained   Coping/Psychosocial Interventions   Environmental Support calm environment promoted   Activity   Activity Type activity adjusted per tolerance   Cardiac Interventions   Warming Thermoregulation Maintenance warm blankets applied       Intervention: Prevent Kandice-procedural Injury    03/01/17 0830   Positioning   Positioning side lying, left   Head Of Bed (HOB) Position HOB elevated         Goal: Signs and Symptoms of Listed Potential Problems Will be Absent or Manageable (GI Endoscopy)  Outcome: Ongoing (interventions implemented as appropriate)    03/01/17 0830   GI Endoscopy   Problems Assessed (GI Endoscopy) all   Problems Present (GI Endoscopy) none

## 2017-03-01 NOTE — ANESTHESIA PREPROCEDURE EVALUATION
Anesthesia Evaluation     Patient summary reviewed   NPO Status: > 8 hours   Airway   no difficulty expected  Dental      Pulmonary    Cardiovascular     Rhythm: regular        Neuro/Psych  GI/Hepatic/Renal/Endo      Musculoskeletal     Abdominal    Substance History      OB/GYN          Other                                    Anesthesia Plan    ASA 2     MAC     intravenous induction   Anesthetic plan and risks discussed with patient.

## 2017-03-02 LAB
CYTO UR: NORMAL
LAB AP CASE REPORT: NORMAL
Lab: NORMAL
PATH REPORT.FINAL DX SPEC: NORMAL
PATH REPORT.GROSS SPEC: NORMAL

## 2017-03-05 NOTE — PROGRESS NOTES
Colon path: some hyperplastic tissue in the right colon, benign; normal tissue in the remainder of the colon.  In the absence of symptoms, her next screening colonoscopy is due in 10 years.    Pls place in 10 year recall.      We can see how she is doing with her diarrhea at her upcoming visit, thanks

## 2017-03-08 ENCOUNTER — TELEPHONE (OUTPATIENT)
Dept: GASTROENTEROLOGY | Facility: CLINIC | Age: 52
End: 2017-03-08

## 2017-03-08 NOTE — TELEPHONE ENCOUNTER
Call to pt.  Advise per DR Collado that colon path: some hyperplastic tissue in the right colon, benign; normal tissue in the remainder of the colon.  In the absence of symptoms, next screening c/s is due in 10 yrs.     We can see how she is doing with her diarrhea at her upcoming visit.  Pt verb understanding and states will begin taking hyoscyamine as prescribed.    Message to Lindsey ABDALLA for 3/2/27 c/s recall.

## 2017-03-08 NOTE — TELEPHONE ENCOUNTER
----- Message from Jammie Collado MD sent at 3/5/2017 12:19 PM EST -----  Colon path: some hyperplastic tissue in the right colon, benign; normal tissue in the remainder of the colon.  In the absence of symptoms, her next screening colonoscopy is due in 10 years.    Pls place in 10 year recall.      We can see how she is doing with her diarrhea at her upcoming visit, thanks

## 2017-03-24 ENCOUNTER — OFFICE VISIT (OUTPATIENT)
Dept: GASTROENTEROLOGY | Facility: CLINIC | Age: 52
End: 2017-03-24

## 2017-03-24 VITALS
HEIGHT: 60 IN | DIASTOLIC BLOOD PRESSURE: 78 MMHG | BODY MASS INDEX: 26.31 KG/M2 | WEIGHT: 134 LBS | SYSTOLIC BLOOD PRESSURE: 118 MMHG

## 2017-03-24 DIAGNOSIS — R10.31 RIGHT LOWER QUADRANT ABDOMINAL PAIN: ICD-10-CM

## 2017-03-24 DIAGNOSIS — K59.1 FUNCTIONAL DIARRHEA: Primary | ICD-10-CM

## 2017-03-24 DIAGNOSIS — K76.0 FATTY LIVER: ICD-10-CM

## 2017-03-24 PROCEDURE — 99214 OFFICE O/P EST MOD 30 MIN: CPT | Performed by: INTERNAL MEDICINE

## 2017-03-24 RX ORDER — DICYCLOMINE HCL 20 MG
20 TABLET ORAL EVERY 6 HOURS PRN
Qty: 90 TABLET | Refills: 2 | Status: SHIPPED | OUTPATIENT
Start: 2017-03-24 | End: 2017-04-27

## 2017-03-24 NOTE — PROGRESS NOTES
Chief Complaint   Patient presents with   • Follow-up     from scope       Subjective     HPI    Khushboo Freeman is a 51 y.o. female who presents for follow-up of diarrhea and abdominal pain diarrhea.  Symptoms have been chronic with recent worsening over the past year and a half.  Colonoscopy  3/1/17-- normal biopsies without evidence of microscopic colitis.  There was some hyperplastic tissue in the biopsy specimens, however no distinct polyp was identified..  SHe says that the Levsin has improved her symptoms.  However, she was only able to get a small amount of that due to her insurance not completely covering it.  She had been taking half a tablet every day.  She complains of R sided adominal pain preceding BMs. Resolves after stooling.   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  She will have to firmly apply pressure with her hand to the painful area.  He also finds that more fibrous foods, such as green beans, lettuce is, and beans give her more significant issues.  She does try to avoid those foods.      Past Medical History:   Diagnosis Date   • Anemia    • Fatty liver    • Fecal incontinence    • History of transfusion    • Kidney infection 2013    Hospitalized   • PONV (postoperative nausea and vomiting)    • Uterine fibroid          Current Outpatient Prescriptions:   •  estradiol (ESTRACE) 0.1 MG/GM vaginal cream, Insert 2 g into the vagina daily. 1/2 gram every other day, Disp: 42 g, Rfl: 12  •  dicyclomine (BENTYL) 20 MG tablet, Take 1 tablet by mouth Every 6 (Six) Hours As Needed (cramping)., Disp: 90 tablet, Rfl: 2  •  IRON, FERROUS GLUCONATE, PO, Take  by mouth., Disp: , Rfl:     Allergies   Allergen Reactions   • Oxycodone-Acetaminophen Hives and Itching   • Penicillins        Social History     Social History   • Marital status:      Spouse name: N/A   • Number of children: 3   • Years  of education: N/A     Occupational History   • instructor      Social History Main Topics   • Smoking status: Never Smoker   • Smokeless tobacco: Never Used   • Alcohol use No   • Drug use: No   • Sexual activity: Yes     Partners: Male     Birth control/ protection: None     Other Topics Concern   • Not on file     Social History Narrative    OB/GYN patient since 2009       Family History   Problem Relation Age of Onset   • Hypertension Mother    • Thyroid disease Mother    • Osteoporosis Mother    • Cancer Father      kidney   • Colon cancer Paternal Uncle        Review of Systems   Constitutional: Negative for activity change, appetite change and fatigue.   HENT: Negative for sore throat and trouble swallowing.    Eyes: Negative.    Respiratory: Negative.    Cardiovascular: Negative.    Gastrointestinal: Positive for diarrhea. Negative for abdominal distention, abdominal pain, blood in stool, nausea, rectal pain and vomiting.   Endocrine: Negative for cold intolerance and heat intolerance.   Genitourinary: Negative for difficulty urinating, dysuria and frequency.        Frequent yeast infections   Musculoskeletal: Negative for arthralgias, back pain and myalgias.   Skin: Negative.    Hematological: Negative for adenopathy. Does not bruise/bleed easily.   Psychiatric/Behavioral: The patient is nervous/anxious.    All other systems reviewed and are negative.      Objective     Vitals:    03/24/17 1432   BP: 118/78       Physical Exam   Constitutional: She is oriented to person, place, and time. She appears well-developed and well-nourished. No distress.   HENT:   Head: Normocephalic and atraumatic.   Right Ear: External ear normal.   Left Ear: External ear normal.   Nose: Nose normal.   Mouth/Throat: Oropharynx is clear and moist.   Eyes: Conjunctivae and EOM are normal. Right eye exhibits no discharge. Left eye exhibits no discharge. No scleral icterus.   Neck: Normal range of motion. Neck supple. No thyromegaly  present.   No supraclavicular adenopathy   Cardiovascular: Normal rate, regular rhythm, normal heart sounds and intact distal pulses.  Exam reveals no gallop.    No murmur heard.  No lower extremity edema   Pulmonary/Chest: Effort normal and breath sounds normal. No stridor. No respiratory distress. She has no wheezes.   Abdominal: Soft. Normal appearance and bowel sounds are normal. She exhibits no distension and no mass. There is no hepatosplenomegaly. There is tenderness. There is no rigidity, no rebound and no guarding. No hernia.   Diffusely tender to palpation   Genitourinary:   Genitourinary Comments: Rectal exam deferred   Musculoskeletal: Normal range of motion. She exhibits no edema or tenderness.   No atrophy of upper or lower extremities.  Normal digits and nails of both hands.   Lymphadenopathy:     She has no cervical adenopathy.   Neurological: She is alert and oriented to person, place, and time. She displays no atrophy. Coordination normal.   Skin: Skin is warm and dry. No rash noted. She is not diaphoretic. No erythema.   Psychiatric: She has a normal mood and affect. Her behavior is normal. Judgment and thought content normal.   Nursing note and vitals reviewed.      WBC   Date Value Ref Range Status   02/03/2017 6.19 4.50 - 10.70 10*3/mm3 Final     RBC   Date Value Ref Range Status   02/03/2017 4.55 3.90 - 5.20 10*6/mm3 Final     Hemoglobin   Date Value Ref Range Status   02/03/2017 14.3 11.9 - 15.5 g/dL Final     Hematocrit   Date Value Ref Range Status   02/03/2017 43.5 35.6 - 45.5 % Final     MCV   Date Value Ref Range Status   02/03/2017 95.6 80.5 - 98.2 fL Final     MCH   Date Value Ref Range Status   02/03/2017 31.4 26.9 - 32.0 pg Final     MCHC   Date Value Ref Range Status   02/03/2017 32.9 32.4 - 36.3 g/dL Final     RDW   Date Value Ref Range Status   02/03/2017 12.7 11.7 - 13.0 % Final     Platelets   Date Value Ref Range Status   02/03/2017 222 140 - 500 10*3/mm3 Final     Neutrophil  Rel %   Date Value Ref Range Status   02/03/2017 53.6 42.7 - 76.0 % Final     Lymphocyte Rel %   Date Value Ref Range Status   02/03/2017 38.4 19.6 - 45.3 % Final     Monocyte Rel %   Date Value Ref Range Status   02/03/2017 6.0 5.0 - 12.0 % Final     Eosinophil Rel %   Date Value Ref Range Status   02/03/2017 1.8 0.3 - 6.2 % Final     Basophil Rel %   Date Value Ref Range Status   02/03/2017 0.2 0.0 - 1.5 % Final     Neutrophils Absolute   Date Value Ref Range Status   02/03/2017 3.32 1.90 - 8.10 10*3/mm3 Final     Lymphocytes Absolute   Date Value Ref Range Status   02/03/2017 2.38 0.90 - 4.80 10*3/mm3 Final     Monocytes Absolute   Date Value Ref Range Status   02/03/2017 0.37 0.20 - 1.20 10*3/mm3 Final     Eosinophils Absolute   Date Value Ref Range Status   02/03/2017 0.11 0.00 - 0.70 10*3/mm3 Final     Basophils Absolute   Date Value Ref Range Status   02/03/2017 0.01 0.00 - 0.20 10*3/mm3 Final       Glucose   Date Value Ref Range Status   08/03/2015 119 (H) 65 - 99 mg/dL Final     Sodium   Date Value Ref Range Status   02/03/2017 144 136 - 145 mmol/L Final     Potassium   Date Value Ref Range Status   02/03/2017 4.1 3.5 - 5.2 mmol/L Final     Total CO2   Date Value Ref Range Status   02/03/2017 27.2 22.0 - 29.0 mmol/L Final     Chloride   Date Value Ref Range Status   02/03/2017 102 98 - 107 mmol/L Final     Creatinine   Date Value Ref Range Status   02/03/2017 0.68 0.57 - 1.00 mg/dL Final     BUN   Date Value Ref Range Status   02/03/2017 16 6 - 20 mg/dL Final     BUN/Creatinine Ratio   Date Value Ref Range Status   02/03/2017 23.5 7.0 - 25.0 Final     Calcium   Date Value Ref Range Status   02/03/2017 9.9 8.6 - 10.5 mg/dL Final     eGFR Non  Am   Date Value Ref Range Status   02/03/2017 91 >60 mL/min/1.73 Final     Alkaline Phosphatase   Date Value Ref Range Status   02/03/2017 135 (H) 39 - 117 U/L Final     Total Protein   Date Value Ref Range Status   08/03/2015 8.3 6.0 - 8.5 g/dL Final     ALT  (SGPT)   Date Value Ref Range Status   02/03/2017 56 (H) 1 - 33 U/L Final     AST (SGOT)   Date Value Ref Range Status   02/03/2017 51 (H) 1 - 32 U/L Final     Total Bilirubin   Date Value Ref Range Status   02/03/2017 0.5 0.1 - 1.2 mg/dL Final     Albumin   Date Value Ref Range Status   02/03/2017 4.80 3.50 - 5.20 g/dL Final     A/G Ratio   Date Value Ref Range Status   02/03/2017 1.6 g/dL Final     EXAMINATION: LIVER SONOGRAM      HISTORY: 51-year-old female with history of elevated transaminase levels      FINDINGS: Sonographic evaluation of the liver and selective structures  of the right upper quadrant was performed. The right kidney has a normal  appearance. Increased echogenicity seen throughout the liver without  evidence for focal abnormality. Liver measures on the order of 13.8 cm  in anterior to posterior dimension. The patient status post  cholecystectomy. The common bile duct measures 0.6 cm in diameter. There  is limited visualization of the pancreas, but the visualized portion of  the pancreas has normal appearance.      IMPRESSION:  1. Findings suggestive of hepatic steatosis.  2. Evidence of prior cholecystectomy.      This report was finalized on 2/27/2017 6:13 PM by Dr. Saturnino Chicas MD.        3/1/17 colon path  1. RIGHT / ASCENDING COLON:  FOCALLY HYPERPLASTIC COLONIC MUCOSA.      2. LEFT / DESCENDING COLON, RANDOM:  COLONIC MUCOSA WITH NO PATHOLOGIC DIAGNOSIS.   UNREMARKABLE MUCOSAL LYMPHOID AGGREGATE.          No notes on file    Assessment/Plan      Functional diarrhea:   -  Most consistent with diarrhea predominant IBS.  Reassuring colonoscopy with no evidence of microscopic colitis on biopsies.  Better with the levsin but her insurance is not covering    Right lower quadrant abdominal pain  -  Persist.  Resolves with bowel movements.  This is related to her need to have bowel movement.  Her schedule precludes her from being able to go to the bathroom when she needs to.    Fatty liver   -  She is working on losing weight.  Mild elevation in her transaminases.  Steatosis confirmed on imaging    Plan:  -I will change her Levsin to dicyclomine; hopefully her insurance will cover this  -Given her a note so that she may try to get to the bathroom during her work day to help with the right lower quadrant pain  -She is to adjust her diet as needed to avoid the foods that trigger more for symptoms.  Additionally I will give her FODMAPS info  -I have advised her to focus on decreasing caloric intake and increasing caloric expenditure via activity to help with weight loss for her fatty liver    Khushboo was seen today for follow-up.    Diagnoses and all orders for this visit:    Functional diarrhea  -     dicyclomine (BENTYL) 20 MG tablet; Take 1 tablet by mouth Every 6 (Six) Hours As Needed (cramping).    Right lower quadrant abdominal pain  -     dicyclomine (BENTYL) 20 MG tablet; Take 1 tablet by mouth Every 6 (Six) Hours As Needed (cramping).    Fatty liver      I have discussed the above plan with the patient.  They verbalize understanding and are in agreement with the plan.  They have been advised to contact the office for any questions, concerns, or changes related to their health.    EMR Dragon/Transcription disclaimer:       Much of this encounter note is an electronic transcription/translation of spoken language to printed text. The electronic translation of spoken language may permit erroneous, or at times, nonsensical words or phrases to be inadvertently transcribed; Although I have reviewed the note for such errors, some may still exist.

## 2017-03-24 NOTE — PATIENT INSTRUCTIONS
Use the dicyclomine for cramps and diarrhea    Review the FODMAPS diet    Goal of 5 pound weight loss over the next 6 months (focus on limiting sweets and increasing activity)    For any additional questions, concerns or changes to your condition after today's office visit please contact the office at [phone].

## 2017-04-27 ENCOUNTER — OFFICE VISIT (OUTPATIENT)
Dept: FAMILY MEDICINE CLINIC | Facility: CLINIC | Age: 52
End: 2017-04-27

## 2017-04-27 VITALS
BODY MASS INDEX: 26.07 KG/M2 | WEIGHT: 132.8 LBS | TEMPERATURE: 99 F | SYSTOLIC BLOOD PRESSURE: 105 MMHG | OXYGEN SATURATION: 98 % | DIASTOLIC BLOOD PRESSURE: 72 MMHG | HEART RATE: 67 BPM | HEIGHT: 60 IN

## 2017-04-27 DIAGNOSIS — R19.7 DIARRHEA, UNSPECIFIED TYPE: ICD-10-CM

## 2017-04-27 DIAGNOSIS — R15.9 STOOL INCONTINENCE: Primary | ICD-10-CM

## 2017-04-27 PROCEDURE — 99213 OFFICE O/P EST LOW 20 MIN: CPT | Performed by: FAMILY MEDICINE

## 2017-04-28 NOTE — PROGRESS NOTES
Subjective   Khushboo Freeman is a 51 y.o. female. CC: diarrhea    History of Present Illness   Belinda is a 51 year old female who comes in today with the complaint of persistent diarrhea.  She has had this for almost a year now.  She has been evaluated by GI and everything turned out OK there.  No blood in her stool.  No dark color.  No nausea.  She complains of abdominal bloating.  She has pain in the suprapubic area which goes to her back.  She has also seen gynecologist because of the discomfort in her genital area.  This is all felt to be secondary to menopausal changes.  Prescription for a hormone cream was given to her and she has only been using that for a few weeks.  She complains of stool incontinence.  She would like to see a dietician to see if dietary changes would help the diarrhea.        The following portions of the patient's history were reviewed and updated as appropriate: allergies, current medications, past medical history, past social history, past surgical history and problem list.    Review of Systems   Constitutional: Negative.  Negative for fatigue and unexpected weight change.   HENT: Negative.  Negative for congestion.    Respiratory: Negative.  Negative for cough, shortness of breath and wheezing.    Cardiovascular: Negative.  Negative for chest pain, palpitations and leg swelling.   Gastrointestinal: Positive for abdominal distention, abdominal pain and diarrhea. Negative for anal bleeding, blood in stool, constipation, nausea, rectal pain and vomiting.        Stool incontinence   Genitourinary: Negative.  Negative for difficulty urinating.   Musculoskeletal: Negative.  Negative for arthralgias and back pain.   Skin: Negative.  Negative for rash.   Neurological: Negative.  Negative for dizziness, light-headedness and headaches.   Psychiatric/Behavioral: Negative.  Negative for dysphoric mood and sleep disturbance. The patient is not nervous/anxious.        Objective   Physical Exam    Constitutional: She is oriented to person, place, and time. She appears well-developed and well-nourished.   Neck: Normal range of motion. Neck supple. No thyromegaly present.   Cardiovascular: Normal rate, regular rhythm and normal heart sounds.    No murmur heard.  Pulmonary/Chest: Effort normal and breath sounds normal.   Abdominal: Soft.   Neurological: She is alert and oriented to person, place, and time.   Skin: Skin is warm and dry. No rash noted.   Psychiatric: She has a normal mood and affect. Her behavior is normal.   Nursing note and vitals reviewed.      Assessment/Plan   Problems Addressed this Visit     None      Visit Diagnoses     Stool incontinence    -  Primary    Relevant Orders    Ambulatory Referral to Colorectal Surgery    Diarrhea, unspecified type        Relevant Orders    Ambulatory Referral to Nutrition Services      Order also placed for dietician.

## 2017-08-04 ENCOUNTER — OFFICE VISIT (OUTPATIENT)
Dept: GASTROENTEROLOGY | Facility: CLINIC | Age: 52
End: 2017-08-04

## 2017-08-04 VITALS
HEIGHT: 60 IN | BODY MASS INDEX: 25.76 KG/M2 | DIASTOLIC BLOOD PRESSURE: 70 MMHG | SYSTOLIC BLOOD PRESSURE: 100 MMHG | WEIGHT: 131.2 LBS

## 2017-08-04 DIAGNOSIS — K58.0 IRRITABLE BOWEL SYNDROME WITH DIARRHEA: ICD-10-CM

## 2017-08-04 DIAGNOSIS — R14.0 BLOATING: Primary | ICD-10-CM

## 2017-08-04 PROCEDURE — 99214 OFFICE O/P EST MOD 30 MIN: CPT | Performed by: INTERNAL MEDICINE

## 2017-08-04 RX ORDER — DICYCLOMINE HYDROCHLORIDE 10 MG/1
10 CAPSULE ORAL 4 TIMES DAILY PRN
Qty: 90 CAPSULE | Refills: 3 | Status: SHIPPED | OUTPATIENT
Start: 2017-08-04 | End: 2020-01-15

## 2017-08-04 NOTE — PATIENT INSTRUCTIONS
Start the dicyclomine.  Take one pill every morning and then can use every 6 hours as needed    Start a daily probiotic (culturelle or align)    Try peppermint oil on your skin or smelling it to help with abdominal cramps    For any additional questions, concerns or changes to your condition after today's office visit please contact the office at [phone].

## 2017-08-04 NOTE — PROGRESS NOTES
"Chief Complaint   Patient presents with   • Follow-up     Diarrhea x 2 days   • Abdominal Pain     RLQ     Subjective     HPI  Khushboo Freeman is a 51 y.o. female who presents for follow up of diarrhea, fatty liver, RLQ pain.  Placed on dicyclomine and asked to review FODMAPS at last visit in March.  Shortly there after she had a URI and was given antibiotics.  This gave her diarrhea for about 2 weeks.  After this, she did well with no pain or diarrhea. She was on summer break and reports a very relaxing summer and wonders if that helped her symptoms.  She did not have problems with diarrhea or abdominal pain.  However, she has been back to work for 3 days now and the RLQ has returned.  Ate zucchini bread and then felt this caused problems.  Pain feels \"deep\" R lower abdomen.  It radiates to the R side.  Having some bloating along with the pain, feels her abdomen get distended. She used to be able to rub her hand over the area to make the pain go away but now that does not work.  Her weight is stable.  She does report diarrhea stools with urgency every morning for the past few days.    No microscopic colitis on her 3/2017 colonoscopy.    Past Medical History:   Diagnosis Date   • Anemia    • Fatty liver    • Fecal incontinence    • History of transfusion    • Kidney infection 2013    Hospitalized   • PONV (postoperative nausea and vomiting)    • Uterine fibroid        Social History     Social History   • Marital status:      Spouse name: N/A   • Number of children: 3   • Years of education: N/A     Occupational History   • instructor      Social History Main Topics   • Smoking status: Never Smoker   • Smokeless tobacco: Never Used   • Alcohol use No   • Drug use: No   • Sexual activity: Yes     Partners: Male     Birth control/ protection: None     Other Topics Concern   • None     Social History Narrative    OB/GYN patient since 2009         Current Outpatient Prescriptions:   •  dicyclomine (BENTYL) " 10 MG capsule, Take 1 capsule by mouth 4 (Four) Times a Day As Needed (diarrhea, pain)., Disp: 90 capsule, Rfl: 3  •  estradiol (ESTRACE) 0.1 MG/GM vaginal cream, Insert 2 g into the vagina daily. 1/2 gram every other day, Disp: 42 g, Rfl: 12  •  IRON, FERROUS GLUCONATE, PO, Take  by mouth., Disp: , Rfl:     Review of Systems   Constitutional: Negative for activity change, appetite change and fatigue.   HENT: Negative for congestion, sore throat and trouble swallowing.    Respiratory: Negative.    Cardiovascular: Negative.    Gastrointestinal: Positive for abdominal distention, abdominal pain and diarrhea. Negative for blood in stool, nausea and vomiting.   Endocrine: Negative for cold intolerance and heat intolerance.   Genitourinary: Negative for difficulty urinating, dysuria and frequency.   Musculoskeletal: Negative for arthralgias, back pain and myalgias.   Skin: Negative.    Hematological: Negative for adenopathy. Does not bruise/bleed easily.   All other systems reviewed and are negative.      Objective   Vitals:    08/04/17 1301   BP: 100/70     Last Weight    08/04/17  1301   Weight: 131 lb 3.2 oz (59.5 kg)     Body mass index is 26.06 kg/(m^2).      Physical Exam   Constitutional: She is oriented to person, place, and time. She appears well-developed and well-nourished. No distress.   HENT:   Head: Normocephalic and atraumatic.   Right Ear: External ear normal.   Left Ear: External ear normal.   Nose: Nose normal.   Eyes: Conjunctivae and EOM are normal. No scleral icterus.   Cardiovascular: Normal rate, regular rhythm, normal heart sounds and intact distal pulses.  Exam reveals no gallop.    No murmur heard.  No lower extremity edema   Pulmonary/Chest: Effort normal and breath sounds normal. No respiratory distress. She has no wheezes.   Abdominal: Soft. Normal appearance and bowel sounds are normal. She exhibits no distension and no mass. There is no hepatosplenomegaly. There is tenderness. There is no  rigidity, no rebound and no guarding.   Mid to R lower abd   Genitourinary:   Genitourinary Comments: Rectal exam deferred   Musculoskeletal: Normal range of motion. She exhibits no edema or tenderness.   Normal digits and nails of both hands.  No atrophy or wasting of upper or lower extremeties   Neurological: She is alert and oriented to person, place, and time. She displays no atrophy. Coordination normal.   Skin: Skin is warm and dry. No rash noted. She is not diaphoretic. No erythema.   Psychiatric: She has a normal mood and affect. Her behavior is normal. Judgment and thought content normal.   Vitals reviewed.      WBC   Date Value Ref Range Status   02/03/2017 6.19 4.50 - 10.70 10*3/mm3 Final     RBC   Date Value Ref Range Status   02/03/2017 4.55 3.90 - 5.20 10*6/mm3 Final     Hemoglobin   Date Value Ref Range Status   02/03/2017 14.3 11.9 - 15.5 g/dL Final     Hematocrit   Date Value Ref Range Status   02/03/2017 43.5 35.6 - 45.5 % Final     MCV   Date Value Ref Range Status   02/03/2017 95.6 80.5 - 98.2 fL Final     MCH   Date Value Ref Range Status   02/03/2017 31.4 26.9 - 32.0 pg Final     MCHC   Date Value Ref Range Status   02/03/2017 32.9 32.4 - 36.3 g/dL Final     RDW   Date Value Ref Range Status   02/03/2017 12.7 11.7 - 13.0 % Final     Platelets   Date Value Ref Range Status   02/03/2017 222 140 - 500 10*3/mm3 Final     Neutrophil Rel %   Date Value Ref Range Status   02/03/2017 53.6 42.7 - 76.0 % Final     Lymphocyte Rel %   Date Value Ref Range Status   02/03/2017 38.4 19.6 - 45.3 % Final     Monocyte Rel %   Date Value Ref Range Status   02/03/2017 6.0 5.0 - 12.0 % Final     Eosinophil Rel %   Date Value Ref Range Status   02/03/2017 1.8 0.3 - 6.2 % Final     Basophil Rel %   Date Value Ref Range Status   02/03/2017 0.2 0.0 - 1.5 % Final     Neutrophils Absolute   Date Value Ref Range Status   02/03/2017 3.32 1.90 - 8.10 10*3/mm3 Final     Lymphocytes Absolute   Date Value Ref Range Status    02/03/2017 2.38 0.90 - 4.80 10*3/mm3 Final     Monocytes Absolute   Date Value Ref Range Status   02/03/2017 0.37 0.20 - 1.20 10*3/mm3 Final     Eosinophils Absolute   Date Value Ref Range Status   02/03/2017 0.11 0.00 - 0.70 10*3/mm3 Final     Basophils Absolute   Date Value Ref Range Status   02/03/2017 0.01 0.00 - 0.20 10*3/mm3 Final       Lab Results   Component Value Date    GLUCOSE 119 (H) 08/03/2015    BUN 16 02/03/2017    CREATININE 0.68 02/03/2017    EGFRIFNONA 91 02/03/2017    EGFRIFAFRI 110 02/03/2017    BCR 23.5 02/03/2017    CO2 27.2 02/03/2017    CALCIUM 9.9 02/03/2017    PROTENTOTREF 7.8 02/03/2017    ALBUMIN 4.80 02/03/2017    LABIL2 1.6 02/03/2017    AST 51 (H) 02/03/2017    ALT 56 (H) 02/03/2017         Imaging Results (last 7 days)     ** No results found for the last 168 hours. **            Assessment/Plan       Functional diarrhea: no issues over the summer, now has returned with the start of school.  She did not fill the dicyclomine     Right lower quadrant abdominal pain: as above but feels worse than before.  Suspect associated with IBS     Fatty liver: per imaging and with elevated transaminases in February.  BMI 26.     Plan  -will get her on the dicyclomine-- she did not fill last time because she was doing well  -to try a probiotic  -to try peppermint oil for pain to alleviate cramps  -discussed need to get her weight into the 120s for her fatty liver    Khushboo was seen today for follow-up and abdominal pain.    Diagnoses and all orders for this visit:    Bloating  -     dicyclomine (BENTYL) 10 MG capsule; Take 1 capsule by mouth 4 (Four) Times a Day As Needed (diarrhea, pain).    Irritable bowel syndrome with diarrhea  -     dicyclomine (BENTYL) 10 MG capsule; Take 1 capsule by mouth 4 (Four) Times a Day As Needed (diarrhea, pain).        Dictated utilizing Dragon dictation

## 2020-01-15 ENCOUNTER — OFFICE VISIT (OUTPATIENT)
Dept: FAMILY MEDICINE CLINIC | Facility: CLINIC | Age: 55
End: 2020-01-15

## 2020-01-15 VITALS
OXYGEN SATURATION: 96 % | BODY MASS INDEX: 26.27 KG/M2 | HEIGHT: 60 IN | DIASTOLIC BLOOD PRESSURE: 62 MMHG | TEMPERATURE: 98.5 F | HEART RATE: 74 BPM | WEIGHT: 133.8 LBS | SYSTOLIC BLOOD PRESSURE: 112 MMHG

## 2020-01-15 DIAGNOSIS — D50.0 IRON DEFICIENCY ANEMIA DUE TO CHRONIC BLOOD LOSS: ICD-10-CM

## 2020-01-15 DIAGNOSIS — Z83.49 FAMILY HISTORY OF THYROID DISEASE: ICD-10-CM

## 2020-01-15 DIAGNOSIS — Z13.820 SCREENING FOR OSTEOPOROSIS: ICD-10-CM

## 2020-01-15 DIAGNOSIS — Z13.220 SCREENING FOR LIPID DISORDERS: ICD-10-CM

## 2020-01-15 DIAGNOSIS — Z12.31 ENCOUNTER FOR SCREENING MAMMOGRAM FOR MALIGNANT NEOPLASM OF BREAST: ICD-10-CM

## 2020-01-15 DIAGNOSIS — R53.83 FATIGUE, UNSPECIFIED TYPE: ICD-10-CM

## 2020-01-15 DIAGNOSIS — L65.9 HAIR LOSS: ICD-10-CM

## 2020-01-15 DIAGNOSIS — Z00.00 ENCOUNTER FOR WELLNESS EXAMINATION: Primary | ICD-10-CM

## 2020-01-15 DIAGNOSIS — E89.40 SURGICAL MENOPAUSE: ICD-10-CM

## 2020-01-15 LAB
ALBUMIN SERPL-MCNC: 4.7 G/DL (ref 3.5–5.2)
ALBUMIN/GLOB SERPL: 1.7 G/DL
ALP SERPL-CCNC: 105 U/L (ref 39–117)
ALT SERPL-CCNC: 57 U/L (ref 1–33)
AST SERPL-CCNC: 67 U/L (ref 1–32)
BASOPHILS # BLD AUTO: 0.02 10*3/MM3 (ref 0–0.2)
BASOPHILS NFR BLD AUTO: 0.4 % (ref 0–1.5)
BILIRUB SERPL-MCNC: 0.7 MG/DL (ref 0.2–1.2)
BUN SERPL-MCNC: 10 MG/DL (ref 6–20)
BUN/CREAT SERPL: 14.3 (ref 7–25)
CALCIUM SERPL-MCNC: 9.3 MG/DL (ref 8.6–10.5)
CHLORIDE SERPL-SCNC: 102 MMOL/L (ref 98–107)
CHOLEST SERPL-MCNC: 252 MG/DL (ref 0–200)
CO2 SERPL-SCNC: 27.2 MMOL/L (ref 22–29)
CREAT SERPL-MCNC: 0.7 MG/DL (ref 0.57–1)
EOSINOPHIL # BLD AUTO: 0.09 10*3/MM3 (ref 0–0.4)
EOSINOPHIL NFR BLD AUTO: 2 % (ref 0.3–6.2)
ERYTHROCYTE [DISTWIDTH] IN BLOOD BY AUTOMATED COUNT: 12.2 % (ref 12.3–15.4)
GLOBULIN SER CALC-MCNC: 2.7 GM/DL
GLUCOSE SERPL-MCNC: 96 MG/DL (ref 65–99)
HCT VFR BLD AUTO: 42 % (ref 34–46.6)
HDLC SERPL-MCNC: 72 MG/DL (ref 40–60)
HGB BLD-MCNC: 14.6 G/DL (ref 12–15.9)
IMM GRANULOCYTES # BLD AUTO: 0.01 10*3/MM3 (ref 0–0.05)
IMM GRANULOCYTES NFR BLD AUTO: 0.2 % (ref 0–0.5)
LDLC SERPL CALC-MCNC: 156 MG/DL (ref 0–100)
LYMPHOCYTES # BLD AUTO: 1.62 10*3/MM3 (ref 0.7–3.1)
LYMPHOCYTES NFR BLD AUTO: 35.4 % (ref 19.6–45.3)
MCH RBC QN AUTO: 32.2 PG (ref 26.6–33)
MCHC RBC AUTO-ENTMCNC: 34.8 G/DL (ref 31.5–35.7)
MCV RBC AUTO: 92.7 FL (ref 79–97)
MONOCYTES # BLD AUTO: 0.28 10*3/MM3 (ref 0.1–0.9)
MONOCYTES NFR BLD AUTO: 6.1 % (ref 5–12)
NEUTROPHILS # BLD AUTO: 2.55 10*3/MM3 (ref 1.7–7)
NEUTROPHILS NFR BLD AUTO: 55.9 % (ref 42.7–76)
NRBC BLD AUTO-RTO: 0 /100 WBC (ref 0–0.2)
PLATELET # BLD AUTO: 217 10*3/MM3 (ref 140–450)
POTASSIUM SERPL-SCNC: 3.9 MMOL/L (ref 3.5–5.2)
PROT SERPL-MCNC: 7.4 G/DL (ref 6–8.5)
RBC # BLD AUTO: 4.53 10*6/MM3 (ref 3.77–5.28)
SODIUM SERPL-SCNC: 141 MMOL/L (ref 136–145)
TRIGL SERPL-MCNC: 121 MG/DL (ref 0–150)
TSH SERPL DL<=0.005 MIU/L-ACNC: 2.92 UIU/ML (ref 0.27–4.2)
VLDLC SERPL CALC-MCNC: 24.2 MG/DL
WBC # BLD AUTO: 4.57 10*3/MM3 (ref 3.4–10.8)

## 2020-01-15 PROCEDURE — 99396 PREV VISIT EST AGE 40-64: CPT | Performed by: FAMILY MEDICINE

## 2020-01-15 RX ORDER — CETIRIZINE HYDROCHLORIDE 10 MG/1
10 TABLET ORAL DAILY
COMMUNITY
End: 2020-09-15
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
## 2020-01-15 NOTE — PATIENT INSTRUCTIONS
Labs/tests were obtained today and follow up will be made based on results and will be called to the patient when available      Mediterranean Diet  A Mediterranean diet refers to food and lifestyle choices that are based on the traditions of countries located on the [place]. This way of eating has been shown to help prevent certain conditions and improve outcomes for people who have chronic diseases, like kidney disease and heart disease.  What are tips for following this plan?  Lifestyle  · Cook and eat meals together with your family, when possible.  · Drink enough fluid to keep your urine clear or pale yellow.  · Be physically active every day. This includes:  ? Aerobic exercise like running or swimming.  ? Leisure activities like gardening, walking, or housework.  · Get 7-8 hours of sleep each night.  · If recommended by your health care provider, drink red wine in moderation. This means 1 glass a day for nonpregnant women and 2 glasses a day for men. A glass of wine equals 5 oz (150 mL).  Reading food labels    · Check the serving size of packaged foods. For foods such as rice and pasta, the serving size refers to the amount of cooked product, not dry.  · Check the total fat in packaged foods. Avoid foods that have saturated fat or trans fats.  · Check the ingredients list for added sugars, such as corn syrup.  Shopping  · At the grocery store, buy most of your food from the areas near the walls of the store. This includes:  ? Fresh fruits and vegetables (produce).  ? Grains, beans, nuts, and seeds. Some of these may be available in unpackaged forms or large amounts (in bulk).  ? Fresh seafood.  ? Poultry and eggs.  ? Low-fat dairy products.  · Buy whole ingredients instead of prepackaged foods.  · Buy fresh fruits and vegetables in-season from local [employer] markets.  · Buy frozen fruits and vegetables in resealable bags.  · If you do not have access to quality fresh seafood, buy precooked frozen  shrimp or canned fish, such as tuna, salmon, or sardines.  · Buy small amounts of raw or cooked vegetables, salads, or olives from the deli or salad bar at your store.  · Stock your pantry so you always have certain foods on hand, such as olive oil, canned tuna, canned tomatoes, rice, pasta, and beans.  Cooking  · Cook foods with extra-virgin olive oil instead of using butter or other vegetable oils.  · Have meat as a side dish, and have vegetables or grains as your main dish. This means having meat in small portions or adding small amounts of meat to foods like pasta or stew.  · Use beans or vegetables instead of meat in common dishes like chili or lasagna.  · [place] with different cooking methods. Try roasting or broiling vegetables instead of steaming or sautéeing them.  · Add frozen vegetables to soups, stews, pasta, or rice.  · Add nuts or seeds for added healthy fat at each meal. You can add these to yogurt, salads, or vegetable dishes.  · Marinate fish or vegetables using olive oil, lemon juice, garlic, and fresh herbs.  Meal planning    · Plan to eat 1 vegetarian meal one day each week. Try to work up to 2 vegetarian meals, if possible.  · Eat seafood 2 or more times a week.  · Have healthy snacks readily available, such as:  ? Vegetable sticks with hummus.  ? Greek yogurt.  ? Fruit and nut trail mix.  · Eat balanced meals throughout the week. This includes:  ? Fruit: 2-3 servings a day  ? Vegetables: 4-5 servings a day  ? Low-fat dairy: 2 servings a day  ? Fish, poultry, or lean meat: 1 serving a day  ? Beans and legumes: 2 or more servings a week  ? Nuts and seeds: 1-2 servings a day  ? Whole grains: 6-8 servings a day  ? Extra-virgin olive oil: 3-4 servings a day  · Limit red meat and sweets to only a few servings a month  What are my food choices?  · Mediterranean diet  ? Recommended  ? Grains: Whole-grain pasta. Brown rice. Bulgar wheat. Polenta. Couscous. Whole-wheat bread. Oatmeal.  Quinoa.  ? Vegetables: Artichokes. Beets. Broccoli. Cabbage. Carrots. Eggplant. Green beans. Chard. Kale. Spinach. Onions. Leeks. Peas. Squash. Tomatoes. Peppers. Radishes.  ? Fruits: Apples. Apricots. Avocado. Berries. Bananas. Cherries. Dates. Figs. Grapes. Rosy. Melon. Oranges. Peaches. Plums. Pomegranate.  ? Meats and other protein foods: Beans. Almonds. Sunflower seeds. Pine nuts. Peanuts. Cod. [place]. Scallops. Shrimp. Tuna. Tilapia. Clams. Oysters. Eggs.  ? Dairy: Low-fat milk. Cheese. Greek yogurt.  ? Beverages: Water. Red wine. Herbal tea.  ? Fats and oils: Extra virgin olive oil. Avocado oil. Grape seed oil.  ? Sweets and desserts: Greek yogurt with honey. Baked apples. Poached pears. Trail mix.  ? Seasoning and other foods: Basil. Cilantro. Coriander. Cumin. Mint. Parsley. [place]. Rosemary. Tarragon. Garlic. Oregano. Thyme. Pepper. Balsalmic vinegar. Tahini. Hummus. Tomato sauce. Olives. Mushrooms.  ? Limit these  ? Grains: Prepackaged pasta or rice dishes. Prepackaged cereal with added sugar.  ? Vegetables: Deep fried potatoes (french fries).  ? Fruits: Fruit canned in syrup.  ? Meats and other protein foods: Beef. Pork. Lamb. Poultry with skin. Hot dogs. Martines.  ? Dairy: Ice cream. Sour cream. Whole milk.  ? Beverages: Juice. Sugar-sweetened soft drinks. Beer. Liquor and spirits.  ? Fats and oils: Butter. Canola oil. Vegetable oil. Beef fat (tallow). Lard.  ? Sweets and desserts: Cookies. Cakes. Pies. Candy.  ? Seasoning and other foods: Mayonnaise. Premade sauces and marinades.  ? The items listed may not be a complete list. Talk with your dietitian about what dietary choices are right for you.  Summary  · The Mediterranean diet includes both food and lifestyle choices.  · Eat a variety of fresh fruits and vegetables, beans, nuts, seeds, and whole grains.  · Limit the amount of red meat and sweets that you eat.  · Talk with your health care provider about whether it is safe for you to drink red wine in  moderation. This means 1 glass a day for nonpregnant women and 2 glasses a day for men. A glass of wine equals 5 oz (150 mL).  This information is not intended to replace advice given to you by your health care provider. Make sure you discuss any questions you have with your health care provider.  Document Released: 08/10/2017 Document Revised: 09/12/2017 Document Reviewed: 08/10/2017  [employer] Interactive Patient Education © 2019 [employer]        Osteoporosis    Osteoporosis is thinning and loss of density in your bones. Osteoporosis makes bones more brittle and fragile and more likely to break (fracture). Over time, osteoporosis can cause your bones to become so weak that they fracture after a minor fall. Bones in the hip, wrist, and spine are most likely to fracture due to osteoporosis.  What are the causes?  The exact cause of this condition is not known.  What increases the risk?  You may be at greater risk for osteoporosis if you:  · Have a family history of the condition.  · Have poor nutrition.  · Use steroid medicines, such as prednisone.  · Are female.  · Are age 50 or older.  · Smoke or have a history of smoking.  · Are not physically active (are sedentary).  · Are white () or of  descent.  · Have a small body frame.  · Take certain medicines, such as antiseizure medicines.  What are the signs or symptoms?  A fracture might be the first sign of osteoporosis, especially if the fracture results from a fall or injury that usually would not cause a bone to break. Other signs and symptoms include:  · Pain in the neck or low back.  · Stooped posture.  · Loss of height.  How is this diagnosed?  This condition may be diagnosed based on:  · Your medical history.  · A physical exam.  · A bone mineral density test, also called a DXA or DEXA test (dual-energy X-ray absorptiometry test). This test uses X-rays to measure the amount of minerals in your bones.  How is this treated?  The goal of  treatment is to strengthen your bones and lower your risk for a fracture. Treatment may involve:  · Making lifestyle changes, such as:  ? Including foods with more calcium and vitamin D in your diet.  ? Doing weight-bearing and muscle-strengthening exercises.  ? Stopping tobacco use.  ? Limiting alcohol intake.  · Taking medicine to slow the process of bone loss or to increase bone density.  · Taking daily supplements of calcium and vitamin D.  · Taking hormone replacement medicines, such as estrogen for women and testosterone for men.  · Monitoring your levels of calcium and vitamin D.  Follow these instructions at home:    Activity  · Exercise as told by your health care provider. Ask your health care provider what exercises and activities are safe for you. You should do:  ? Exercises that make you work against gravity (weight-bearing exercises), such as edmar chi, yoga, or walking.  ? Exercises to strengthen muscles, such as lifting weights.  Lifestyle  · Limit alcohol intake to no more than 1 drink a day for nonpregnant women and 2 drinks a day for men. One drink equals 12 oz of beer, 5 oz of wine, or 1½ oz of hard liquor.  · Do not use any products that contain nicotine or tobacco, such as cigarettes and e-cigarettes. If you need help quitting, ask your health care provider.  Preventing falls  · Use devices to help you move around (mobility aids) as needed, such as canes, walkers, scooters, or crutches.  · Keep rooms well-lit and clutter-free.  · Remove tripping hazards from walkways, including cords and throw rugs.  · Install grab bars in bathrooms and safety rails on stairs.  · Use rubber mats in the bathroom and other areas that are often wet or slippery.  · Wear closed-toe shoes that fit well and support your feet. Wear shoes that have rubber soles or low heels.  · Review your medicines with your health care provider. Some medicines can cause dizziness or changes in blood pressure, which can increase your  risk of falling.  General instructions  · Include calcium and vitamin D in your diet. Calcium is important for bone health, and vitamin D helps your body to absorb calcium. Good sources of calcium and vitamin D include:  ? Certain fatty fish, such as salmon and tuna.  ? Products that have calcium and vitamin D added to them (fortified products), such as fortified cereals.  ? Egg yolks.  ? Cheese.  ? Liver.  · Take over-the-counter and prescription medicines only as told by your health care provider.  · Keep all follow-up visits as told by your health care provider. This is important.  Contact a health care provider if:  · You have never been screened for osteoporosis and you are:  ? A woman who is age 65 or older.  ? A man who is age 70 or older.  Get help right away if:  · You fall or injure yourself.  Summary  · Osteoporosis is thinning and loss of density in your bones. This makes bones more brittle and fragile and more likely to break (fracture),even with minor falls.  · The goal of treatment is to strengthen your bones and reduce your risk for a fracture.  · Include calcium and vitamin D in your diet. Calcium is important for bone health, and vitamin D helps your body to absorb calcium.  · Talk with your health care provider about screening for osteoporosis if you are a woman who is age 65 or older, or a man who is age 70 or older.  This information is not intended to replace advice given to you by your health care provider. Make sure you discuss any questions you have with your health care provider.  Document Released: 09/27/2006 Document Revised: 10/12/2018 Document Reviewed: 10/12/2018  [employer] Interactive Patient Education © 2019 [employer]

## 2020-01-30 ENCOUNTER — OFFICE VISIT (OUTPATIENT)
Dept: FAMILY MEDICINE CLINIC | Facility: CLINIC | Age: 55
End: 2020-01-30

## 2020-01-30 VITALS
WEIGHT: 134.8 LBS | BODY MASS INDEX: 26.46 KG/M2 | TEMPERATURE: 98 F | OXYGEN SATURATION: 96 % | DIASTOLIC BLOOD PRESSURE: 60 MMHG | HEART RATE: 73 BPM | HEIGHT: 60 IN | SYSTOLIC BLOOD PRESSURE: 108 MMHG

## 2020-01-30 DIAGNOSIS — R79.89 ELEVATED LIVER FUNCTION TESTS: Primary | ICD-10-CM

## 2020-01-30 DIAGNOSIS — K76.0 HEPATIC STEATOSIS: ICD-10-CM

## 2020-01-30 PROCEDURE — 99214 OFFICE O/P EST MOD 30 MIN: CPT | Performed by: FAMILY MEDICINE

## 2020-01-30 NOTE — PROGRESS NOTES
Subjective   Khushboo Freeman is a 54 y.o. female.     Chief Complaint   Patient presents with   • Diarrhea     many times a day especially PM   • Nasal Congestion       Patient is here today with a new problem.  She states that the problem has been ongoing for many years but really worsened about 2 years ago.  She has had issues with constipation and diarrhea in the past but over the last 2 years's been mostly diarrhea.  She used to take iron and it would constipate her and that is how she would handle the diarrhea in the beginning.  However she has not taken iron in 4 months.  She also has intermittent abdominal pain after eating.  The symptoms were worked up by Dr. Castañeda in the past who did labs and a ultrasound of the liver.  She was diagnosed with fatty liver disease but unfortunately never really understood how to treat that and made no changes in her diet.  The patient tries to avoid vegetables because it makes her diarrhea worse.  She will eat peanuts which helps to firm up her stools. She denies any blood or black tarry stools.  No N/V.  The patient remembers that she has had an endoscopy but it has been several years ago, no concerning findings.       Review of Systems   Constitutional: Negative for activity change, chills, fatigue and fever.   HENT: Negative for hearing loss, swollen glands, tinnitus and trouble swallowing.    Eyes: Negative for pain and visual disturbance.   Respiratory: Negative for cough and shortness of breath.    Cardiovascular: Negative for chest pain, palpitations and leg swelling.   Gastrointestinal: Negative for diarrhea and nausea.   Endocrine: Negative for polydipsia and polyuria.   Genitourinary: Negative for difficulty urinating and urinary incontinence.   Musculoskeletal: Negative for arthralgias, gait problem and joint swelling.   Skin: Negative for rash.   Allergic/Immunologic: Negative for immunocompromised state.   Neurological: Negative for dizziness,  "light-headedness and headache.   Hematological: Negative for adenopathy. Does not bruise/bleed easily.   Psychiatric/Behavioral: Negative for dysphoric mood and sleep disturbance.       The following portions of the patient's history were reviewed and updated as appropriate: allergies, current medications, past family history, past medical history, past social history, past surgical history and problem list.    Past Medical History:   Diagnosis Date   • Anemia    • Fatty liver    • Fecal incontinence    • History of transfusion    • Kidney infection 2013    Hospitalized   • Uterine fibroid        Past Surgical History:   Procedure Laterality Date   • CHOLECYSTECTOMY     • COLONOSCOPY  2016    normal per pt   • COLONOSCOPY N/A 3/1/2017    [facility]   • HYSTERECTOMY  07/2009   • RECTAL SURGERY  07/2016    Had a tear of rectum.  Dr. Lee       Family History   Problem Relation Age of Onset   • Hypertension Mother    • Thyroid disease Mother    • Osteoporosis Mother    • Cancer Father         kidney   • Colon cancer Paternal Uncle        Social History     Socioeconomic History   • Marital status:      Spouse name: Not on file   • Number of children: 3   • Years of education: Not on file   • Highest education level: Not on file   Occupational History   • Occupation: instructor   Tobacco Use   • Smoking status: Never Smoker   • Smokeless tobacco: Never Used   Substance and Sexual Activity   • Alcohol use: No   • Drug use: No   • Sexual activity: Yes     Partners: Male     Birth control/protection: None   Social History Narrative    OB/GYN patient since 2009         Current Outpatient Medications:   •  cetirizine (zyrTEC) 10 MG tablet, Take 10 mg by mouth Daily., Disp: , Rfl:     Objective     Vitals:    01/30/20 0816   BP: 108/60   Pulse: 73   Temp: 98 °F (36.7 °C)   SpO2: 96%   Weight: 61.1 kg (134 lb 12.8 oz)   Height: 151.1 cm (59.5\")       Body mass index is 26.77 kg/m².    No components found for: 2D    Physical " Exam   Constitutional: She is oriented to person, place, and time. She appears well-developed and well-nourished.   HENT:   Head: Normocephalic and atraumatic.   Eyes: Conjunctivae are normal.   Neck: Normal range of motion. Neck supple.   Cardiovascular: Normal rate, regular rhythm, normal heart sounds and intact distal pulses.   Pulmonary/Chest: Effort normal and breath sounds normal.   Abdominal: Soft. Bowel sounds are normal.   Musculoskeletal: Normal range of motion. She exhibits no edema.   Neurological: She is alert and oriented to person, place, and time.   Skin: Skin is warm and dry. Capillary refill takes less than 2 seconds. No rash noted.   Psychiatric: She has a normal mood and affect. Her behavior is normal. Judgment and thought content normal.   Nursing note and vitals reviewed.    Liver ultrasound from 2017 was reviewed.  Findings revealed evidence of a prior cholecystectomy and hepatic steatosis    Procedures    Assessment/Plan   Khushboo was seen today for diarrhea and nasal congestion.    Diagnoses and all orders for this visit:    Elevated liver function tests  -     Hepatitis panel, acute    Hepatic steatosis  -     Hepatitis panel, acute    I spent greater than 35 minutes in direct patient contact counseling the patient about the new diagnosis of fatty liver disease, his prognosis and made recommendations for treatment.    Patient Instructions   I I feel that the majority of the bowel issues the patient is experiencing is related to both her fatty liver but also her dietary choices.  We had a long discussion and I have made recommendations for change.  I have also recommended Culturelle probiotics to the patient.  The patient understands that there is no medicine that she can take to treat fatty liver disease and that the only way that she can avoid it progressing to cirrhosis is to change her diet and start with daily regular exercises..      Mediterranean Diet  A Mediterranean diet refers to  food and lifestyle choices that are based on the traditions of countries located on the [place]. This way of eating has been shown to help prevent certain conditions and improve outcomes for people who have chronic diseases, like kidney disease and heart disease.  What are tips for following this plan?  Lifestyle  · Cook and eat meals together with your family, when possible.  · Drink enough fluid to keep your urine clear or pale yellow.  · Be physically active every day. This includes:  ? Aerobic exercise like running or swimming.  ? Leisure activities like gardening, walking, or housework.  · Get 7-8 hours of sleep each night.  · If recommended by your health care provider, drink red wine in moderation. This means 1 glass a day for nonpregnant women and 2 glasses a day for men. A glass of wine equals 5 oz (150 mL).  Reading food labels    · Check the serving size of packaged foods. For foods such as rice and pasta, the serving size refers to the amount of cooked product, not dry.  · Check the total fat in packaged foods. Avoid foods that have saturated fat or trans fats.  · Check the ingredients list for added sugars, such as corn syrup.  Shopping  · At the grocery store, buy most of your food from the areas near the walls of the store. This includes:  ? Fresh fruits and vegetables (produce).  ? Grains, beans, nuts, and seeds. Some of these may be available in unpackaged forms or large amounts (in bulk).  ? Fresh seafood.  ? Poultry and eggs.  ? Low-fat dairy products.  · Buy whole ingredients instead of prepackaged foods.  · Buy fresh fruits and vegetables in-season from local farmers markets.  · Buy frozen fruits and vegetables in resealable bags.  · If you do not have access to quality fresh seafood, buy precooked frozen shrimp or canned fish, such as tuna, salmon, or sardines.  · Buy small amounts of raw or cooked vegetables, salads, or olives from the deli or salad bar at your store.  · Stock your  pantry so you always have certain foods on hand, such as olive oil, canned tuna, canned tomatoes, rice, pasta, and beans.  Cooking  · Cook foods with extra-virgin olive oil instead of using butter or other vegetable oils.  · Have meat as a side dish, and have vegetables or grains as your main dish. This means having meat in small portions or adding small amounts of meat to foods like pasta or stew.  · Use beans or vegetables instead of meat in common dishes like chili or lasagna.  · [place] with different cooking methods. Try roasting or broiling vegetables instead of steaming or sautéeing them.  · Add frozen vegetables to soups, stews, pasta, or rice.  · Add nuts or seeds for added healthy fat at each meal. You can add these to yogurt, salads, or vegetable dishes.  · Marinate fish or vegetables using olive oil, lemon juice, garlic, and fresh herbs.  Meal planning    · Plan to eat 1 vegetarian meal one day each week. Try to work up to 2 vegetarian meals, if possible.  · Eat seafood 2 or more times a week.  · Have healthy snacks readily available, such as:  ? Vegetable sticks with hummus.  ? Greek yogurt.  ? Fruit and nut trail mix.  · Eat balanced meals throughout the week. This includes:  ? Fruit: 2-3 servings a day  ? Vegetables: 4-5 servings a day  ? Low-fat dairy: 2 servings a day  ? Fish, poultry, or lean meat: 1 serving a day  ? Beans and legumes: 2 or more servings a week  ? Nuts and seeds: 1-2 servings a day  ? Whole grains: 6-8 servings a day  ? Extra-virgin olive oil: 3-4 servings a day  · Limit red meat and sweets to only a few servings a month  What are my food choices?  · Mediterranean diet  ? Recommended  ? Grains: Whole-grain pasta. Brown rice. Bulgar wheat. Polenta. Couscous. Whole-wheat bread. Oatmeal. Quinoa.  ? Vegetables: Artichokes. Beets. Broccoli. Cabbage. Carrots. Eggplant. Green beans. Chard. Kale. Spinach. Onions. Leeks. Peas. Squash. Tomatoes. Peppers. Radishes.  ? Fruits: Apples.  Apricots. Avocado. Berries. Bananas. Cherries. Dates. Figs. Grapes. Rosy. Melon. Oranges. Peaches. Plums. Pomegranate.  ? Meats and other protein foods: Beans. Almonds. Sunflower seeds. Pine nuts. Peanuts. Cod. [place]. Scallops. Shrimp. Tuna. Tilapia. Clams. Oysters. Eggs.  ? Dairy: Low-fat milk. Cheese. Greek yogurt.  ? Beverages: Water. Red wine. Herbal tea.  ? Fats and oils: Extra virgin olive oil. Avocado oil. Grape seed oil.  ? Sweets and desserts: Greek yogurt with honey. Baked apples. Poached pears. Trail mix.  ? Seasoning and other foods: Basil. Cilantro. Coriander. Cumin. Mint. Parsley. [place]. Rosemary. Tarragon. Garlic. Oregano. Thyme. Pepper. Balsalmic vinegar. Tahini. Hummus. Tomato sauce. Olives. Mushrooms.  ? Limit these  ? Grains: Prepackaged pasta or rice dishes. Prepackaged cereal with added sugar.  ? Vegetables: Deep fried potatoes (french fries).  ? Fruits: Fruit canned in syrup.  ? Meats and other protein foods: Beef. Pork. Lamb. Poultry with skin. Hot dogs. Martines.  ? Dairy: Ice cream. Sour cream. Whole milk.  ? Beverages: Juice. Sugar-sweetened soft drinks. Beer. Liquor and spirits.  ? Fats and oils: Butter. Canola oil. Vegetable oil. Beef fat (tallow). Lard.  ? Sweets and desserts: Cookies. Cakes. Pies. Candy.  ? Seasoning and other foods: Mayonnaise. Premade sauces and marinades.  ? The items listed may not be a complete list. Talk with your dietitian about what dietary choices are right for you.  Summary  · The Mediterranean diet includes both food and lifestyle choices.  · Eat a variety of fresh fruits and vegetables, beans, nuts, seeds, and whole grains.  · Limit the amount of red meat and sweets that you eat.  · Talk with your health care provider about whether it is safe for you to drink red wine in moderation. This means 1 glass a day for nonpregnant women and 2 glasses a day for men. A glass of wine equals 5 oz (150 mL).  This information is not intended to replace advice given to you  by your health care provider. Make sure you discuss any questions you have with your health care provider.  Document Released: 08/10/2017 Document Revised: 09/12/2017 Document Reviewed: 08/10/2017  [employer] Interactive Patient Education © 2019 [employer]  Fatty Liver Disease    Fatty liver disease occurs when too much fat has built up in your liver cells. Fatty liver disease is also called hepatic steatosis or steatohepatitis. The liver removes harmful substances from your bloodstream and produces fluids that your body needs. It also helps your body use and store energy from the food you eat.  In many cases, fatty liver disease does not cause symptoms or problems. It is often diagnosed when tests are being done for other reasons. However, over time, fatty liver can cause inflammation that may lead to more serious liver problems, such as scarring of the liver (cirrhosis) and liver failure.  Fatty liver is associated with insulin resistance, increased body fat, high blood pressure (hypertension), and high cholesterol. These are features of metabolic syndrome and increase your risk for stroke, diabetes, and heart disease.  What are the causes?  This condition may be caused by:  · Drinking too much alcohol.  · Poor nutrition.  · Obesity.  · Cushing's syndrome.  · Diabetes.  · High cholesterol.  · Certain drugs.  · Poisons.  · Some viral infections.  · Pregnancy.  What increases the risk?  You are more likely to develop this condition if you:  · Abuse alcohol.  · Are overweight.  · Have diabetes.  · Have hepatitis.  · Have a high triglyceride level.  · Are pregnant.  What are the signs or symptoms?  Fatty liver disease often does not cause symptoms. If symptoms do develop, they can include:  · Fatigue.  · Weakness.  · Weight loss.  · Confusion.  · Abdominal pain.  · Nausea and vomiting.  · Yellowing of your skin and the white parts of your eyes (jaundice).  · Itchy skin.  How is this diagnosed?  This condition may be  diagnosed by:  · A physical exam and medical history.  · Blood tests.  · Imaging tests, such as an ultrasound, CT scan, or MRI.  · A liver biopsy. A small sample of liver tissue is removed using a needle. The sample is then looked at under a microscope.  How is this treated?  Fatty liver disease is often caused by other health conditions. Treatment for fatty liver may involve medicines and lifestyle changes to manage conditions such as:  · Alcoholism.  · High cholesterol.  · Diabetes.  · Being overweight or obese.  Follow these instructions at home:    · Do not drink alcohol. If you have trouble quitting, ask your health care provider how to safely quit with the help of medicine or a supervised program. This is important to keep your condition from getting worse.  · Eat a healthy diet as told by your health care provider. Ask your health care provider about working with a diet and nutrition specialist (dietitian) to develop an eating plan.  · Exercise regularly. This can help you lose weight and control your cholesterol and diabetes. Talk to your health care provider about an exercise plan and which activities are best for you.  · Take over-the-counter and prescription medicines only as told by your health care provider.  · Keep all follow-up visits as told by your health care provider. This is important.  Contact a health care provider if:  You have trouble controlling your:  · Blood sugar. This is especially important if you have diabetes.  · Cholesterol.  · Drinking of alcohol.  Get help right away if:  · You have abdominal pain.  · You have jaundice.  · You have nausea and vomiting.  · You vomit blood or material that looks like coffee grounds.  · You have stools that are black, tar-like, or bloody.  Summary  · Fatty liver disease develops when too much fat builds up in the cells of your liver.  · Fatty liver disease often causes no symptoms or problems. However, over time, fatty liver can cause inflammation  that may lead to more serious liver problems, such as scarring of the liver (cirrhosis).  · You are more likely to develop this condition if you abuse alcohol, are pregnant, are overweight, have diabetes, have hepatitis, or have high triglyceride levels.  · Contact your health care provider if you have trouble controlling your weight, blood sugar, cholesterol, or drinking of alcohol.  This information is not intended to replace advice given to you by your health care provider. Make sure you discuss any questions you have with your health care provider.  Document Released: 02/02/2007 Document Revised: 09/26/2018 Document Reviewed: 09/26/2018  [employer] Interactive Patient Education © 2019 [employer]    Kegel Exercises    Kegel exercises can help strengthen your pelvic floor muscles. The pelvic floor is a group of muscles that support your rectum, small intestine, and bladder. In females, pelvic floor muscles also help support the womb (uterus). These muscles help you control the flow of urine and stool.  Kegel exercises are painless and simple, and they do not require any equipment. Your provider may suggest Kegel exercises to:  · Improve bladder and bowel control.  · Improve sexual response.  · Improve weak pelvic floor muscles after surgery to remove the uterus (hysterectomy) or pregnancy (females).  · Improve weak pelvic floor muscles after prostate gland removal or surgery (males).  Kegel exercises involve squeezing your pelvic floor muscles, which are the same muscles you squeeze when you try to stop the flow of urine or keep from passing gas. The exercises can be done while sitting, standing, or lying down, but it is best to vary your position.  Exercises  How to do Kegel exercises:  1. Squeeze your pelvic floor muscles tight. You should feel a tight lift in your rectal area. If you are a female, you should also feel a tightness in your vaginal area. Keep your stomach, buttocks, and legs relaxed.  2. Hold the  muscles tight for up to 10 seconds.  3. Breathe normally.  4. Relax your muscles.  5. Repeat as told by your health care provider.  Repeat this exercise daily as told by your health care provider. Continue to do this exercise for at least 4-6 weeks, or for as long as told by your health care provider.  You may be referred to a physical therapist who can help you learn more about how to do Kegel exercises.  Depending on your condition, your health care provider may recommend:  · Varying how long you squeeze your muscles.  · Doing several sets of exercises every day.  · Doing exercises for several weeks.  · Making Kegel exercises a part of your regular exercise routine.  This information is not intended to replace advice given to you by your health care provider. Make sure you discuss any questions you have with your health care provider.  Document Released: 12/04/2013 Document Revised: 08/07/2019 Document Reviewed: 08/07/2019  [employer] Interactive Patient Education © 2019 [employer]

## 2020-01-30 NOTE — PATIENT INSTRUCTIONS
I I feel that the majority of the bowel issues the patient is experiencing is related to both her fatty liver but also her dietary choices.  We had a long discussion and I have made recommendations for change.  I have also recommended Culturelle probiotics to the patient.  The patient understands that there is no medicine that she can take to treat fatty liver disease and that the only way that she can avoid it progressing to cirrhosis is to change her diet and start with daily regular exercises..      Mediterranean Diet  A Mediterranean diet refers to food and lifestyle choices that are based on the traditions of countries located on the [place]. This way of eating has been shown to help prevent certain conditions and improve outcomes for people who have chronic diseases, like kidney disease and heart disease.  What are tips for following this plan?  Lifestyle  · Cook and eat meals together with your family, when possible.  · Drink enough fluid to keep your urine clear or pale yellow.  · Be physically active every day. This includes:  ? Aerobic exercise like running or swimming.  ? Leisure activities like gardening, walking, or housework.  · Get 7-8 hours of sleep each night.  · If recommended by your health care provider, drink red wine in moderation. This means 1 glass a day for nonpregnant women and 2 glasses a day for men. A glass of wine equals 5 oz (150 mL).  Reading food labels    · Check the serving size of packaged foods. For foods such as rice and pasta, the serving size refers to the amount of cooked product, not dry.  · Check the total fat in packaged foods. Avoid foods that have saturated fat or trans fats.  · Check the ingredients list for added sugars, such as corn syrup.  Shopping  · At the grocery store, buy most of your food from the areas near the walls of the store. This includes:  ? Fresh fruits and vegetables (produce).  ? Grains, beans, nuts, and seeds. Some of these may be  available in unpackaged forms or large amounts (in bulk).  ? Fresh seafood.  ? Poultry and eggs.  ? Low-fat dairy products.  · Buy whole ingredients instead of prepackaged foods.  · Buy fresh fruits and vegetables in-season from local farmers markets.  · Buy frozen fruits and vegetables in resealable bags.  · If you do not have access to quality fresh seafood, buy precooked frozen shrimp or canned fish, such as tuna, salmon, or sardines.  · Buy small amounts of raw or cooked vegetables, salads, or olives from the deli or salad bar at your store.  · Stock your pantry so you always have certain foods on hand, such as olive oil, canned tuna, canned tomatoes, rice, pasta, and beans.  Cooking  · Cook foods with extra-virgin olive oil instead of using butter or other vegetable oils.  · Have meat as a side dish, and have vegetables or grains as your main dish. This means having meat in small portions or adding small amounts of meat to foods like pasta or stew.  · Use beans or vegetables instead of meat in common dishes like chili or lasagna.  · Spivey with different cooking methods. Try roasting or broiling vegetables instead of steaming or sautéeing them.  · Add frozen vegetables to soups, stews, pasta, or rice.  · Add nuts or seeds for added healthy fat at each meal. You can add these to yogurt, salads, or vegetable dishes.  · Marinate fish or vegetables using olive oil, lemon juice, garlic, and fresh herbs.  Meal planning    · Plan to eat 1 vegetarian meal one day each week. Try to work up to 2 vegetarian meals, if possible.  · Eat seafood 2 or more times a week.  · Have healthy snacks readily available, such as:  ? Vegetable sticks with hummus.  ? Greek yogurt.  ? Fruit and nut trail mix.  · Eat balanced meals throughout the week. This includes:  ? Fruit: 2-3 servings a day  ? Vegetables: 4-5 servings a day  ? Low-fat dairy: 2 servings a day  ? Fish, poultry, or lean meat: 1 serving a day  ? Beans and legumes: 2 or  more servings a week  ? Nuts and seeds: 1-2 servings a day  ? Whole grains: 6-8 servings a day  ? Extra-virgin olive oil: 3-4 servings a day  · Limit red meat and sweets to only a few servings a month  What are my food choices?  · Mediterranean diet  ? Recommended  ? Grains: Whole-grain pasta. Brown rice. Bulgar wheat. Polenta. Couscous. Whole-wheat bread. Oatmeal. Quinoa.  ? Vegetables: Artichokes. Beets. Broccoli. Cabbage. Carrots. Eggplant. Green beans. Chard. Kale. Spinach. Onions. Leeks. Peas. Squash. Tomatoes. Peppers. Radishes.  ? Fruits: Apples. Apricots. Avocado. Berries. Bananas. Cherries. Dates. Figs. Grapes. Rosy. Melon. Oranges. Peaches. Plums. Pomegranate.  ? Meats and other protein foods: Beans. Almonds. Sunflower seeds. Pine nuts. Peanuts. Cod. Wanda. Scallops. Shrimp. Tuna. Tilapia. Clams. Oysters. Eggs.  ? Dairy: Low-fat milk. Cheese. Greek yogurt.  ? Beverages: Water. Red wine. Herbal tea.  ? Fats and oils: Extra virgin olive oil. Avocado oil. Grape seed oil.  ? Sweets and desserts: Greek yogurt with honey. Baked apples. Poached pears. Trail mix.  ? Seasoning and other foods: Basil. Cilantro. Coriander. Cumin. Mint. Parsley. Clifford. Rosemary. Tarragon. Garlic. Oregano. Thyme. Pepper. Balsalmic vinegar. Tahini. Hummus. Tomato sauce. Olives. Mushrooms.  ? Limit these  ? Grains: Prepackaged pasta or rice dishes. Prepackaged cereal with added sugar.  ? Vegetables: Deep fried potatoes (french fries).  ? Fruits: Fruit canned in syrup.  ? Meats and other protein foods: Beef. Pork. Lamb. Poultry with skin. Hot dogs. Martines.  ? Dairy: Ice cream. Sour cream. Whole milk.  ? Beverages: Juice. Sugar-sweetened soft drinks. Beer. Liquor and spirits.  ? Fats and oils: Butter. Canola oil. Vegetable oil. Beef fat (tallow). Lard.  ? Sweets and desserts: Cookies. Cakes. Pies. Candy.  ? Seasoning and other foods: Mayonnaise. Premade sauces and marinades.  ? The items listed may not be a complete list. Talk with your  dietitian about what dietary choices are right for you.  Summary  · The Mediterranean diet includes both food and lifestyle choices.  · Eat a variety of fresh fruits and vegetables, beans, nuts, seeds, and whole grains.  · Limit the amount of red meat and sweets that you eat.  · Talk with your health care provider about whether it is safe for you to drink red wine in moderation. This means 1 glass a day for nonpregnant women and 2 glasses a day for men. A glass of wine equals 5 oz (150 mL).  This information is not intended to replace advice given to you by your health care provider. Make sure you discuss any questions you have with your health care provider.  Document Released: 08/10/2017 Document Revised: 09/12/2017 Document Reviewed: 08/10/2017  [employer] Interactive Patient Education © 2019 [employer]  Fatty Liver Disease    Fatty liver disease occurs when too much fat has built up in your liver cells. Fatty liver disease is also called hepatic steatosis or steatohepatitis. The liver removes harmful substances from your bloodstream and produces fluids that your body needs. It also helps your body use and store energy from the food you eat.  In many cases, fatty liver disease does not cause symptoms or problems. It is often diagnosed when tests are being done for other reasons. However, over time, fatty liver can cause inflammation that may lead to more serious liver problems, such as scarring of the liver (cirrhosis) and liver failure.  Fatty liver is associated with insulin resistance, increased body fat, high blood pressure (hypertension), and high cholesterol. These are features of metabolic syndrome and increase your risk for stroke, diabetes, and heart disease.  What are the causes?  This condition may be caused by:  · Drinking too much alcohol.  · Poor nutrition.  · Obesity.  · Cushing's syndrome.  · Diabetes.  · High cholesterol.  · Certain drugs.  · Poisons.  · Some viral  infections.  · Pregnancy.  What increases the risk?  You are more likely to develop this condition if you:  · Abuse alcohol.  · Are overweight.  · Have diabetes.  · Have hepatitis.  · Have a high triglyceride level.  · Are pregnant.  What are the signs or symptoms?  Fatty liver disease often does not cause symptoms. If symptoms do develop, they can include:  · Fatigue.  · Weakness.  · Weight loss.  · Confusion.  · Abdominal pain.  · Nausea and vomiting.  · Yellowing of your skin and the white parts of your eyes (jaundice).  · Itchy skin.  How is this diagnosed?  This condition may be diagnosed by:  · A physical exam and medical history.  · Blood tests.  · Imaging tests, such as an ultrasound, CT scan, or MRI.  · A liver biopsy. A small sample of liver tissue is removed using a needle. The sample is then looked at under a microscope.  How is this treated?  Fatty liver disease is often caused by other health conditions. Treatment for fatty liver may involve medicines and lifestyle changes to manage conditions such as:  · Alcoholism.  · High cholesterol.  · Diabetes.  · Being overweight or obese.  Follow these instructions at home:    · Do not drink alcohol. If you have trouble quitting, ask your health care provider how to safely quit with the help of medicine or a supervised program. This is important to keep your condition from getting worse.  · Eat a healthy diet as told by your health care provider. Ask your health care provider about working with a diet and nutrition specialist (dietitian) to develop an eating plan.  · Exercise regularly. This can help you lose weight and control your cholesterol and diabetes. Talk to your health care provider about an exercise plan and which activities are best for you.  · Take over-the-counter and prescription medicines only as told by your health care provider.  · Keep all follow-up visits as told by your health care provider. This is important.  Contact a health care provider  if:  You have trouble controlling your:  · Blood sugar. This is especially important if you have diabetes.  · Cholesterol.  · Drinking of alcohol.  Get help right away if:  · You have abdominal pain.  · You have jaundice.  · You have nausea and vomiting.  · You vomit blood or material that looks like coffee grounds.  · You have stools that are black, tar-like, or bloody.  Summary  · Fatty liver disease develops when too much fat builds up in the cells of your liver.  · Fatty liver disease often causes no symptoms or problems. However, over time, fatty liver can cause inflammation that may lead to more serious liver problems, such as scarring of the liver (cirrhosis).  · You are more likely to develop this condition if you abuse alcohol, are pregnant, are overweight, have diabetes, have hepatitis, or have high triglyceride levels.  · Contact your health care provider if you have trouble controlling your weight, blood sugar, cholesterol, or drinking of alcohol.  This information is not intended to replace advice given to you by your health care provider. Make sure you discuss any questions you have with your health care provider.  Document Released: 02/02/2007 Document Revised: 09/26/2018 Document Reviewed: 09/26/2018  [employer] Interactive Patient Education © 2019 [employer]    Kegel Exercises    Kegel exercises can help strengthen your pelvic floor muscles. The pelvic floor is a group of muscles that support your rectum, small intestine, and bladder. In females, pelvic floor muscles also help support the womb (uterus). These muscles help you control the flow of urine and stool.  Kegel exercises are painless and simple, and they do not require any equipment. Your provider may suggest Kegel exercises to:  · Improve bladder and bowel control.  · Improve sexual response.  · Improve weak pelvic floor muscles after surgery to remove the uterus (hysterectomy) or pregnancy (females).  · Improve weak pelvic floor muscles  after prostate gland removal or surgery (males).  Kegel exercises involve squeezing your pelvic floor muscles, which are the same muscles you squeeze when you try to stop the flow of urine or keep from passing gas. The exercises can be done while sitting, standing, or lying down, but it is best to vary your position.  Exercises  How to do Kegel exercises:  1. Squeeze your pelvic floor muscles tight. You should feel a tight lift in your rectal area. If you are a female, you should also feel a tightness in your vaginal area. Keep your stomach, buttocks, and legs relaxed.  2. Hold the muscles tight for up to 10 seconds.  3. Breathe normally.  4. Relax your muscles.  5. Repeat as told by your health care provider.  Repeat this exercise daily as told by your health care provider. Continue to do this exercise for at least 4-6 weeks, or for as long as told by your health care provider.  You may be referred to a physical therapist who can help you learn more about how to do Kegel exercises.  Depending on your condition, your health care provider may recommend:  · Varying how long you squeeze your muscles.  · Doing several sets of exercises every day.  · Doing exercises for several weeks.  · Making Kegel exercises a part of your regular exercise routine.  This information is not intended to replace advice given to you by your health care provider. Make sure you discuss any questions you have with your health care provider.  Document Released: 12/04/2013 Document Revised: 08/07/2019 Document Reviewed: 08/07/2019  [employer] Interactive Patient Education © 2019 [employer]

## 2020-01-31 LAB
HAV IGM SERPL QL IA: NEGATIVE
HBV CORE IGM SERPL QL IA: NEGATIVE
HBV SURFACE AG SERPL QL IA: NEGATIVE
HCV AB S/CO SERPL IA: 0.1 S/CO RATIO (ref 0–0.9)

## 2020-02-24 ENCOUNTER — OFFICE VISIT (OUTPATIENT)
Dept: FAMILY MEDICINE CLINIC | Facility: CLINIC | Age: 55
End: 2020-02-24

## 2020-02-24 VITALS
HEART RATE: 77 BPM | HEIGHT: 60 IN | DIASTOLIC BLOOD PRESSURE: 78 MMHG | TEMPERATURE: 98.7 F | WEIGHT: 135.6 LBS | OXYGEN SATURATION: 99 % | SYSTOLIC BLOOD PRESSURE: 114 MMHG | BODY MASS INDEX: 26.62 KG/M2

## 2020-02-24 DIAGNOSIS — J02.0 STREP THROAT: Primary | ICD-10-CM

## 2020-02-24 DIAGNOSIS — R50.9 FEVER, UNSPECIFIED FEVER CAUSE: ICD-10-CM

## 2020-02-24 LAB
EXPIRATION DATE: NORMAL
EXPIRATION DATE: NORMAL
FLUAV AG NPH QL: NEGATIVE
FLUBV AG NPH QL: NEGATIVE
INTERNAL CONTROL: NORMAL
INTERNAL CONTROL: NORMAL
Lab: NORMAL
Lab: NORMAL
S PYO AG THROAT QL: NEGATIVE

## 2020-02-24 PROCEDURE — 87880 STREP A ASSAY W/OPTIC: CPT | Performed by: FAMILY MEDICINE

## 2020-02-24 PROCEDURE — 99213 OFFICE O/P EST LOW 20 MIN: CPT | Performed by: FAMILY MEDICINE

## 2020-02-24 PROCEDURE — 87804 INFLUENZA ASSAY W/OPTIC: CPT | Performed by: FAMILY MEDICINE

## 2020-02-24 RX ORDER — PENICILLIN V POTASSIUM 500 MG/1
500 TABLET ORAL 3 TIMES DAILY
Qty: 30 TABLET | Refills: 0 | Status: SHIPPED | OUTPATIENT
Start: 2020-02-24 | End: 2020-09-15

## 2020-02-24 RX ORDER — IBUPROFEN 600 MG/1
600 TABLET ORAL EVERY 6 HOURS PRN
COMMUNITY
End: 2020-09-15

## 2020-02-24 NOTE — PROGRESS NOTES
Subjective   Khushboo Freeman is a 54 y.o. female.     Chief Complaint   Patient presents with   • Cough     ear pain   • Fever   • Sore Throat   • Diarrhea       Patient is here for new problem.  She has been experiencing a cough for a couple of weeks but it suddenly got worse 3 days ago.  She also began with a fever,chills, sore throat, headache, congestion, diarrhea, fatigue and nausea.  She has also noticed swollen glands mostly on the left side of her neck.  She did not get the flu vaccine this year.  Pt traveled to the [facility] 2 weeks ago.  She was exposed to strep and the flu prior to leaving for her trip.       Review of Systems   Constitutional: Positive for fever. Negative for activity change, chills and fatigue.   HENT: Positive for sore throat. Negative for hearing loss, swollen glands, tinnitus and trouble swallowing.    Eyes: Negative for pain and visual disturbance.   Respiratory: Positive for cough. Negative for shortness of breath.    Cardiovascular: Negative for chest pain, palpitations and leg swelling.   Gastrointestinal: Positive for diarrhea and nausea.   Endocrine: Negative for polydipsia and polyuria.   Genitourinary: Negative for difficulty urinating and urinary incontinence.   Musculoskeletal: Negative for arthralgias, gait problem and joint swelling.   Skin: Negative for rash.   Allergic/Immunologic: Negative for immunocompromised state.   Neurological: Positive for headache. Negative for dizziness and light-headedness.   Hematological: Negative for adenopathy. Does not bruise/bleed easily.   Psychiatric/Behavioral: Negative for dysphoric mood and sleep disturbance.       The following portions of the patient's history were reviewed and updated as appropriate: allergies, current medications, past family history, past medical history, past social history, past surgical history and problem list.    Past Medical History:   Diagnosis Date   • Anemia    • Fatty liver    •  "Fecal incontinence    • History of transfusion    • Kidney infection 2013    Hospitalized   • Uterine fibroid        Past Surgical History:   Procedure Laterality Date   • CHOLECYSTECTOMY     • COLONOSCOPY  2016    normal per pt   • COLONOSCOPY N/A 3/1/2017    [facility]   • HYSTERECTOMY  07/2009   • RECTAL SURGERY  07/2016    Had a tear of rectum.  Dr. Lee       Family History   Problem Relation Age of Onset   • Hypertension Mother    • Thyroid disease Mother    • Osteoporosis Mother    • Cancer Father         kidney   • Colon cancer Paternal Uncle        Social History     Socioeconomic History   • Marital status:      Spouse name: Not on file   • Number of children: 3   • Years of education: Not on file   • Highest education level: Not on file   Occupational History   • Occupation: instructor   Tobacco Use   • Smoking status: Never Smoker   • Smokeless tobacco: Never Used   Substance and Sexual Activity   • Alcohol use: No   • Drug use: No   • Sexual activity: Yes     Partners: Male     Birth control/protection: None   Social History Narrative    OB/GYN patient since 2009         Current Outpatient Medications:   •  ibuprofen (ADVIL,MOTRIN) 600 MG tablet, Take 600 mg by mouth Every 6 (Six) Hours As Needed for Mild Pain ., Disp: , Rfl:   •  cetirizine (zyrTEC) 10 MG tablet, Take 10 mg by mouth Daily., Disp: , Rfl:   •  penicillin v potassium (VEETID) 500 MG tablet, Take 1 tablet by mouth 3 (Three) Times a Day., Disp: 30 tablet, Rfl: 0    Objective     Vitals:    02/24/20 1351   BP: 114/78   Pulse: 77   Temp: 98.7 °F (37.1 °C)   SpO2: 99%   Weight: 61.5 kg (135 lb 9.6 oz)   Height: 151.1 cm (59.5\")       Body mass index is 26.93 kg/m².    No components found for: 2D    Physical Exam   Constitutional: She is oriented to person, place, and time. She appears well-developed and well-nourished.   HENT:   Head: Normocephalic and atraumatic.   Right Ear: External ear normal.   Left Ear: External ear normal.   Nose: " Nose normal.   Mouth/Throat: Uvula is midline and mucous membranes are normal. Posterior oropharyngeal erythema present.   Eyes: Conjunctivae are normal. No scleral icterus.   Neck: Normal range of motion. Neck supple.   Cardiovascular: Normal rate, regular rhythm, normal heart sounds and intact distal pulses.   Pulmonary/Chest: Effort normal and breath sounds normal.   Abdominal: Soft. Bowel sounds are normal.   Musculoskeletal: Normal range of motion. She exhibits no edema.   Lymphadenopathy:     She has cervical adenopathy (Anterior cervical lymphadenopathy worse on the left than the right.).   Neurological: She is alert and oriented to person, place, and time.   Skin: Skin is warm and dry. Capillary refill takes less than 2 seconds. No rash noted.   Psychiatric: She has a normal mood and affect. Her behavior is normal. Judgment and thought content normal.   Nursing note and vitals reviewed.      Procedures    Assessment/Plan   Khushboo was seen today for cough, fever, sore throat and diarrhea.    Diagnoses and all orders for this visit:    Strep throat  -     penicillin v potassium (VEETID) 500 MG tablet; Take 1 tablet by mouth 3 (Three) Times a Day.    Fever, unspecified fever cause  -     POC Rapid Strep A  -     POC Influenza A / B        Patient Instructions   Increase late fluids.  Rest.  Stay home.  I advised the patient to throw out her toothbrush and use a brand-new one after her antibiotics are finished.    Strep Throat    Strep throat is an infection of the throat. It is caused by germs. Strep throat spreads from person to person because of coughing, sneezing, or close contact.  Follow these instructions at home:  Medicines  · Take over-the-counter and prescription medicines only as told by your doctor.  · Take your antibiotic medicine as told by your doctor. Do not stop taking the medicine even if you feel better.  · Have family members who also have a sore throat or fever go to a doctor.  Eating and  drinking  · Do not share food, drinking cups, or personal items.  · Try eating soft foods until your sore throat feels better.  · Drink enough fluid to keep your pee (urine) clear or pale yellow.  General instructions  · Rinse your mouth (gargle) with a salt-water mixture 3-4 times per day or as needed. To make a salt-water mixture, stir ½-1 tsp of salt into 1 cup of warm water.  · Make sure that all people in your house wash their hands well.  · Rest.  · Stay home from school or work until you have been taking antibiotics for 24 hours.  · Keep all follow-up visits as told by your doctor. This is important.  Contact a doctor if:  · Your neck keeps getting bigger.  · You get a rash, cough, or earache.  · You cough up thick liquid that is green, yellow-brown, or bloody.  · You have pain that does not get better with medicine.  · Your problems get worse instead of getting better.  · You have a fever.  Get help right away if:  · You throw up (vomit).  · You get a very bad headache.  · You neck hurts or it feels stiff.  · You have chest pain or you are short of breath.  · You have drooling, very bad throat pain, or changes in your voice.  · Your neck is swollen or the skin gets red and tender.  · Your mouth is dry or you are peeing less than normal.  · You keep feeling more tired or it is hard to wake up.  · Your joints are red or they hurt.  This information is not intended to replace advice given to you by your health care provider. Make sure you discuss any questions you have with your health care provider.  Document Released: 06/05/2009 Document Revised: 08/16/2017 Document Reviewed: 04/11/2016  [employer] Interactive Patient Education © 2020 [employer]

## 2020-02-24 NOTE — PATIENT INSTRUCTIONS
Increase late fluids.  Rest.  Stay home.  I advised the patient to throw out her toothbrush and use a brand-new one after her antibiotics are finished.    Strep Throat    Strep throat is an infection of the throat. It is caused by germs. Strep throat spreads from person to person because of coughing, sneezing, or close contact.  Follow these instructions at home:  Medicines  · Take over-the-counter and prescription medicines only as told by your doctor.  · Take your antibiotic medicine as told by your doctor. Do not stop taking the medicine even if you feel better.  · Have family members who also have a sore throat or fever go to a doctor.  Eating and drinking  · Do not share food, drinking cups, or personal items.  · Try eating soft foods until your sore throat feels better.  · Drink enough fluid to keep your pee (urine) clear or pale yellow.  General instructions  · Rinse your mouth (gargle) with a salt-water mixture 3-4 times per day or as needed. To make a salt-water mixture, stir ½-1 tsp of salt into 1 cup of warm water.  · Make sure that all people in your house wash their hands well.  · Rest.  · Stay home from school or work until you have been taking antibiotics for 24 hours.  · Keep all follow-up visits as told by your doctor. This is important.  Contact a doctor if:  · Your neck keeps getting bigger.  · You get a rash, cough, or earache.  · You cough up thick liquid that is green, yellow-brown, or bloody.  · You have pain that does not get better with medicine.  · Your problems get worse instead of getting better.  · You have a fever.  Get help right away if:  · You throw up (vomit).  · You get a very bad headache.  · You neck hurts or it feels stiff.  · You have chest pain or you are short of breath.  · You have drooling, very bad throat pain, or changes in your voice.  · Your neck is swollen or the skin gets red and tender.  · Your mouth is dry or you are peeing less than normal.  · You keep feeling more  tired or it is hard to wake up.  · Your joints are red or they hurt.  This information is not intended to replace advice given to you by your health care provider. Make sure you discuss any questions you have with your health care provider.  Document Released: 06/05/2009 Document Revised: 08/16/2017 Document Reviewed: 04/11/2016  [employer] Interactive Patient Education © 2020 [employer]

## 2020-02-28 ENCOUNTER — APPOINTMENT (OUTPATIENT)
Dept: BONE DENSITY | Facility: HOSPITAL | Age: 55
End: 2020-02-28

## 2020-02-28 ENCOUNTER — APPOINTMENT (OUTPATIENT)
Dept: MAMMOGRAPHY | Facility: HOSPITAL | Age: 55
End: 2020-02-28

## 2020-04-09 ENCOUNTER — HOSPITAL ENCOUNTER (OUTPATIENT)
Dept: BONE DENSITY | Facility: HOSPITAL | Age: 55
End: 2020-04-09

## 2020-04-09 ENCOUNTER — HOSPITAL ENCOUNTER (OUTPATIENT)
Dept: MAMMOGRAPHY | Facility: HOSPITAL | Age: 55
End: 2020-04-09

## 2020-05-05 ENCOUNTER — APPOINTMENT (OUTPATIENT)
Dept: MAMMOGRAPHY | Facility: HOSPITAL | Age: 55
End: 2020-05-05

## 2020-05-05 ENCOUNTER — APPOINTMENT (OUTPATIENT)
Dept: BONE DENSITY | Facility: HOSPITAL | Age: 55
End: 2020-05-05

## 2020-07-06 ENCOUNTER — HOSPITAL ENCOUNTER (OUTPATIENT)
Dept: MAMMOGRAPHY | Facility: HOSPITAL | Age: 55
Discharge: HOME | End: 2020-07-06
Admitting: FAMILY MEDICINE

## 2020-07-06 DIAGNOSIS — Z12.31 ENCOUNTER FOR SCREENING MAMMOGRAM FOR MALIGNANT NEOPLASM OF BREAST: ICD-10-CM

## 2020-07-06 PROCEDURE — 77067 SCR MAMMO BI INCL CAD: CPT

## 2020-07-06 PROCEDURE — 77063 BREAST TOMOSYNTHESIS BI: CPT

## 2020-07-08 DIAGNOSIS — R92.8 ABNORMAL MAMMOGRAM OF RIGHT BREAST: Primary | ICD-10-CM

## 2020-07-10 ENCOUNTER — OFFICE VISIT (OUTPATIENT)
Dept: FAMILY MEDICINE CLINIC | Facility: CLINIC | Age: 55
End: 2020-07-10

## 2020-07-10 ENCOUNTER — HOSPITAL ENCOUNTER (OUTPATIENT)
Dept: GENERAL RADIOLOGY | Facility: HOSPITAL | Age: 55
Discharge: HOME | End: 2020-07-10

## 2020-07-10 ENCOUNTER — HOSPITAL ENCOUNTER (OUTPATIENT)
Dept: GENERAL RADIOLOGY | Facility: HOSPITAL | Age: 55
Discharge: HOME | End: 2020-07-10
Admitting: FAMILY MEDICINE

## 2020-07-10 VITALS
OXYGEN SATURATION: 96 % | TEMPERATURE: 97.3 F | HEIGHT: 60 IN | DIASTOLIC BLOOD PRESSURE: 56 MMHG | WEIGHT: 137.2 LBS | BODY MASS INDEX: 26.93 KG/M2 | HEART RATE: 68 BPM | SYSTOLIC BLOOD PRESSURE: 118 MMHG

## 2020-07-10 DIAGNOSIS — S96.912A STRAIN OF LEFT FOOT, INITIAL ENCOUNTER: ICD-10-CM

## 2020-07-10 DIAGNOSIS — S96.912A STRAIN OF LEFT FOOT, INITIAL ENCOUNTER: Primary | ICD-10-CM

## 2020-07-10 DIAGNOSIS — S93.492A SPRAIN OF OTHER LIGAMENT OF LEFT ANKLE, INITIAL ENCOUNTER: ICD-10-CM

## 2020-07-10 DIAGNOSIS — S93.492A SPRAIN OF OTHER LIGAMENT OF LEFT ANKLE, INITIAL ENCOUNTER: Primary | ICD-10-CM

## 2020-07-10 DIAGNOSIS — R60.9 SUBMANDIBULAR GLAND SWELLING: ICD-10-CM

## 2020-07-10 PROCEDURE — 99214 OFFICE O/P EST MOD 30 MIN: CPT | Performed by: FAMILY MEDICINE

## 2020-07-10 PROCEDURE — 73630 X-RAY EXAM OF FOOT: CPT

## 2020-07-10 PROCEDURE — 73610 X-RAY EXAM OF ANKLE: CPT

## 2020-07-10 NOTE — PATIENT INSTRUCTIONS
Follow up pending results of xrays.    Gland swelling is very mild.  Monitor for other symptoms and let us know if they develop or if it worsens.

## 2020-07-10 NOTE — PROGRESS NOTES
Subjective   Khushboo Freeman is a 54 y.o. female.     Chief Complaint   Patient presents with   • Foot Injury     fell 2 weeks ago and left foot is swollen, and painful        Twisted left ankle 2 weeks ago.  It went numb.  OK if she moves fast.  Hurts if weight touches arch.  Swelling since.  No previous injury.  Did not try any medication.  Tried some Arnica cream.  Hurts when she tries to sleep.  Has to put her foot up and use a wrap.           The following portions of the patient's history were reviewed and updated as appropriate: allergies, current medications, past family history, past medical history, past social history, past surgical history and problem list.    Past Medical History:   Diagnosis Date   • Anemia    • Fatty liver    • Fecal incontinence    • History of transfusion    • Kidney infection 2013    Hospitalized   • Uterine fibroid        Past Surgical History:   Procedure Laterality Date   • CHOLECYSTECTOMY     • COLONOSCOPY  2016    normal per pt   • COLONOSCOPY N/A 3/1/2017    [facility]   • HYSTERECTOMY  07/2009   • RECTAL SURGERY  07/2016    Had a tear of rectum.  Dr. Lee       Family History   Problem Relation Age of Onset   • Hypertension Mother    • Thyroid disease Mother    • Osteoporosis Mother    • Cancer Father         kidney   • Colon cancer Paternal Uncle        Social History     Socioeconomic History   • Marital status:      Spouse name: Not on file   • Number of children: 3   • Years of education: Not on file   • Highest education level: Not on file   Occupational History   • Occupation: instructor   Tobacco Use   • Smoking status: Never Smoker   • Smokeless tobacco: Never Used   Substance and Sexual Activity   • Alcohol use: No   • Drug use: No   • Sexual activity: Yes     Partners: Male     Birth control/protection: None   Social History Narrative    OB/GYN patient since 2009         Current Outpatient Medications:   •  cetirizine (zyrTEC) 10 MG tablet, Take 10 mg by  "mouth Daily., Disp: , Rfl:   •  ibuprofen (ADVIL,MOTRIN) 600 MG tablet, Take 600 mg by mouth Every 6 (Six) Hours As Needed for Mild Pain ., Disp: , Rfl:   •  penicillin v potassium (VEETID) 500 MG tablet, Take 1 tablet by mouth 3 (Three) Times a Day., Disp: 30 tablet, Rfl: 0    Review of Systems   Constitutional: Negative for chills, fatigue and fever.   HENT: Negative for congestion, rhinorrhea and sore throat.    Respiratory: Negative for cough and shortness of breath.    Cardiovascular: Negative for chest pain and leg swelling.   Gastrointestinal: Negative for abdominal pain.   Endocrine: Negative for polydipsia and polyuria.   Genitourinary: Negative for dysuria.   Musculoskeletal: Positive for arthralgias. Negative for myalgias.   Skin: Negative for rash.   Neurological: Negative for dizziness.   Hematological: Does not bruise/bleed easily.   Psychiatric/Behavioral: Negative for sleep disturbance.       Objective   Vitals:    07/10/20 1118   BP: 118/56   Pulse: 68   Temp: 97.3 °F (36.3 °C)   SpO2: 96%   Weight: 62.2 kg (137 lb 3.2 oz)   Height: 151.1 cm (59.5\")     Body mass index is 27.25 kg/m².  Physical Exam   Constitutional: She is oriented to person, place, and time. She appears well-developed and well-nourished. No distress.   HENT:   Head: Normocephalic and atraumatic.   Eyes: Pupils are equal, round, and reactive to light. Conjunctivae are normal.   Musculoskeletal: Normal range of motion. She exhibits tenderness. She exhibits no edema or deformity.   Left foot and ankle with full range of motion.  Tender over the medial foot near the first metatarsal tarsal joint.  No ligamentous laxity palpated.  First metatarsal does not feel tender.   Neurological: She is alert and oriented to person, place, and time.   Psychiatric: She has a normal mood and affect.   Nursing note and vitals reviewed.        Assessment/Plan   Khushboo was seen today for foot injury.    Diagnoses and all orders for this " visit:    Strain of left foot, initial encounter  -     XR Foot 3+ View Left; Future    Sprain of other ligament of left ankle, initial encounter  -     XR Ankle 3+ View Left; Future    Submandibular gland swelling               Patient Instructions   Follow up pending results of xrays.    Gland swelling is very mild.  Monitor for other symptoms and let us know if they develop or if it worsens.

## 2020-07-13 DIAGNOSIS — S93.492A SPRAIN OF OTHER LIGAMENT OF LEFT ANKLE, INITIAL ENCOUNTER: ICD-10-CM

## 2020-07-13 DIAGNOSIS — S96.912A STRAIN OF LEFT FOOT, INITIAL ENCOUNTER: Primary | ICD-10-CM

## 2020-07-24 ENCOUNTER — HOSPITAL ENCOUNTER (OUTPATIENT)
Dept: BONE DENSITY | Facility: HOSPITAL | Age: 55
Discharge: HOME | End: 2020-07-24
Admitting: FAMILY MEDICINE

## 2020-07-24 DIAGNOSIS — E89.40 SURGICAL MENOPAUSE: ICD-10-CM

## 2020-07-24 DIAGNOSIS — Z13.820 SCREENING FOR OSTEOPOROSIS: ICD-10-CM

## 2020-07-24 PROCEDURE — 77080 DXA BONE DENSITY AXIAL: CPT

## 2020-07-31 ENCOUNTER — HOSPITAL ENCOUNTER (OUTPATIENT)
Dept: MAMMOGRAPHY | Facility: HOSPITAL | Age: 55
Discharge: HOME | End: 2020-07-31
Admitting: FAMILY MEDICINE

## 2020-07-31 ENCOUNTER — HOSPITAL ENCOUNTER (OUTPATIENT)
Dept: ULTRASOUND IMAGING | Facility: HOSPITAL | Age: 55
Discharge: HOME | End: 2020-07-31

## 2020-07-31 DIAGNOSIS — R92.8 ABNORMAL MAMMOGRAM OF RIGHT BREAST: ICD-10-CM

## 2020-07-31 PROCEDURE — 77065 DX MAMMO INCL CAD UNI: CPT

## 2020-07-31 PROCEDURE — G0279 TOMOSYNTHESIS, MAMMO: HCPCS

## 2020-07-31 PROCEDURE — 76642 ULTRASOUND BREAST LIMITED: CPT

## 2020-08-11 ENCOUNTER — TELEPHONE (OUTPATIENT)
Dept: FAMILY MEDICINE CLINIC | Facility: CLINIC | Age: 55
End: 2020-08-11

## 2020-08-11 DIAGNOSIS — R92.8 ABNORMAL MAMMOGRAM OF RIGHT BREAST: Primary | ICD-10-CM

## 2020-08-11 NOTE — TELEPHONE ENCOUNTER
Detail Level: Zone Please talk to Jammie about this.  I have already given instructions on her mammogram results to Jammie to set up a biopsy.  However, I do not believe Jammie has heard back from the patient yet   Detail Level: Simple

## 2020-09-01 ENCOUNTER — HOSPITAL ENCOUNTER (OUTPATIENT)
Dept: ULTRASOUND IMAGING | Facility: HOSPITAL | Age: 55
Discharge: HOME | End: 2020-09-01
Admitting: FAMILY MEDICINE

## 2020-09-01 VITALS
DIASTOLIC BLOOD PRESSURE: 61 MMHG | WEIGHT: 138 LBS | RESPIRATION RATE: 16 BRPM | SYSTOLIC BLOOD PRESSURE: 124 MMHG | HEART RATE: 75 BPM | TEMPERATURE: 97.8 F | HEIGHT: 59 IN | OXYGEN SATURATION: 98 % | BODY MASS INDEX: 27.82 KG/M2

## 2020-09-01 DIAGNOSIS — R92.8 ABNORMAL MAMMOGRAM OF RIGHT BREAST: ICD-10-CM

## 2020-09-01 PROCEDURE — 88341 IMHCHEM/IMCYTCHM EA ADD ANTB: CPT | Performed by: FAMILY MEDICINE

## 2020-09-01 PROCEDURE — 88360 TUMOR IMMUNOHISTOCHEM/MANUAL: CPT | Performed by: FAMILY MEDICINE

## 2020-09-01 PROCEDURE — A4648 IMPLANTABLE TISSUE MARKER: HCPCS

## 2020-09-01 PROCEDURE — 88305 TISSUE EXAM BY PATHOLOGIST: CPT | Performed by: FAMILY MEDICINE

## 2020-09-01 PROCEDURE — 25010000003 LIDOCAINE 1 % SOLUTION: Performed by: RADIOLOGY

## 2020-09-01 PROCEDURE — 88342 IMHCHEM/IMCYTCHM 1ST ANTB: CPT | Performed by: FAMILY MEDICINE

## 2020-09-01 RX ORDER — DIAZEPAM 5 MG/1
5 TABLET ORAL ONCE AS NEEDED
Status: DISCONTINUED | OUTPATIENT
Start: 2020-09-01 | End: 2020-09-02 | Stop reason: HOSPADM

## 2020-09-01 RX ORDER — LIDOCAINE HYDROCHLORIDE AND EPINEPHRINE 10; 10 MG/ML; UG/ML
10 INJECTION, SOLUTION INFILTRATION; PERINEURAL ONCE
Status: COMPLETED | OUTPATIENT
Start: 2020-09-01 | End: 2020-09-01

## 2020-09-01 RX ORDER — LIDOCAINE HYDROCHLORIDE 10 MG/ML
10 INJECTION, SOLUTION INFILTRATION; PERINEURAL ONCE
Status: COMPLETED | OUTPATIENT
Start: 2020-09-01 | End: 2020-09-01

## 2020-09-01 RX ADMIN — LIDOCAINE HYDROCHLORIDE 10 ML: 10 INJECTION, SOLUTION INFILTRATION; PERINEURAL at 15:05

## 2020-09-01 RX ADMIN — LIDOCAINE HYDROCHLORIDE,EPINEPHRINE BITARTRATE 9 ML: 10; .01 INJECTION, SOLUTION INFILTRATION; PERINEURAL at 15:05

## 2020-09-01 NOTE — NURSING NOTE
Biopsy site to right lower mid breast clear with Skin Affix dry and intact. No firmness or swelling noted at or around biopsy site. Denies pain. Ice pack with protective covering applied to biopsy site. Discharge instructions discussed with understanding voiced by patient. Copies provided to patient. No distress noted. To home via private vehicle.

## 2020-09-01 NOTE — H&P
Name: Khushboo Freeman ADMIT: 2020   : 1965  PCP: Nicolle Galvez DO    MRN: [id number] LOS: 0 days   AGE/SEX: 54 y.o. female  ROOM: Room/bed info not found       Chief complaint right breast lesion    Present Illness or Internal History:  Patient is a 54 y.o. female presents with a right breast lesion.     Past Surgical History:  Past Surgical History:   Procedure Laterality Date   • CHOLECYSTECTOMY     • COLONOSCOPY      normal per pt   • COLONOSCOPY N/A 3/1/2017    [facility]   • HYSTERECTOMY  2009   • RECTAL SURGERY  2016    Had a tear of rectum.  Dr. Lee       Past Medical History:  Past Medical History:   Diagnosis Date   • Anemia    • Fatty liver    • Fecal incontinence    • History of transfusion    • Kidney infection     Hospitalized   • Uterine fibroid        Home Medications:    (Not in a hospital admission)    Allergies:  Oxycodone-acetaminophen and Prednisone    Family History:  Family History   Problem Relation Age of Onset   • Hypertension Mother    • Thyroid disease Mother    • Osteoporosis Mother    • Cancer Father         kidney   • Colon cancer Paternal Uncle        Social History:  Social History     Tobacco Use   • Smoking status: Never Smoker   • Smokeless tobacco: Never Used   Substance Use Topics   • Alcohol use: No   • Drug use: No        Objective     Physical Exam:    No exam performed today,    Vital Signs  Temp:  [97.8 °F (36.6 °C)] 97.8 °F (36.6 °C)  Heart Rate:  [72] 72  Resp:  [18] 18  BP: (126)/(78) 126/78    Anticipated Surgical Procedure:  Ultrasound guided vacuum assisted right breast biopsy with clip placement    The risks, benefits and alternatives of this procedure have been discussed with the patient or responsible party: Yes        Saturnino Chicas Jr., MD  20  14:51

## 2020-09-04 ENCOUNTER — TELEPHONE (OUTPATIENT)
Dept: FAMILY MEDICINE CLINIC | Facility: CLINIC | Age: 55
End: 2020-09-04

## 2020-09-04 LAB
CYTO UR: NORMAL
LAB AP CASE REPORT: NORMAL
LAB AP CLINICAL INFORMATION: NORMAL
LAB AP DIAGNOSIS COMMENT: NORMAL
LAB AP SPECIAL STAINS: NORMAL
PATH REPORT.FINAL DX SPEC: NORMAL
PATH REPORT.GROSS SPEC: NORMAL

## 2020-09-08 DIAGNOSIS — R92.8 ABNORMAL MAMMOGRAM OF RIGHT BREAST: Primary | ICD-10-CM

## 2020-09-08 DIAGNOSIS — D24.1 INTRADUCTAL PAPILLOMA OF BREAST, RIGHT: ICD-10-CM

## 2020-09-15 ENCOUNTER — OFFICE VISIT (OUTPATIENT)
Dept: FAMILY MEDICINE CLINIC | Facility: CLINIC | Age: 55
End: 2020-09-15

## 2020-09-15 VITALS
HEART RATE: 87 BPM | SYSTOLIC BLOOD PRESSURE: 106 MMHG | DIASTOLIC BLOOD PRESSURE: 74 MMHG | BODY MASS INDEX: 27.82 KG/M2 | WEIGHT: 138 LBS | OXYGEN SATURATION: 98 % | TEMPERATURE: 97.1 F | HEIGHT: 59 IN

## 2020-09-15 DIAGNOSIS — M25.572 CHRONIC PAIN OF LEFT ANKLE: ICD-10-CM

## 2020-09-15 DIAGNOSIS — N64.89 HEMATOMA OF RIGHT BREAST: Primary | ICD-10-CM

## 2020-09-15 DIAGNOSIS — G89.29 CHRONIC PAIN OF LEFT ANKLE: ICD-10-CM

## 2020-09-15 DIAGNOSIS — Z23 NEEDS FLU SHOT: ICD-10-CM

## 2020-09-15 PROCEDURE — 90471 IMMUNIZATION ADMIN: CPT | Performed by: FAMILY MEDICINE

## 2020-09-15 PROCEDURE — 99213 OFFICE O/P EST LOW 20 MIN: CPT | Performed by: FAMILY MEDICINE

## 2020-09-15 PROCEDURE — 90686 IIV4 VACC NO PRSV 0.5 ML IM: CPT | Performed by: FAMILY MEDICINE

## 2020-09-15 NOTE — PROGRESS NOTES
Subjective   Khushboo Freeman is a 54 y.o. female.     Chief Complaint   Patient presents with   • Breast Pain     Right Side lump, hardened since biopsy sept 1st   • Foot Pain     Left Side, dislocated       On September 1, 2020 the patient had a right breast biopsy for an abnormal mammogram.  She noticed a hematoma and bruising following the procedure.  She presents today for follow-up.  The bruising is slowly improving however the patient has also noticed a lump beneath the bruising.  It is tender in that area.  She denies any nipple discharge.  It was recommended by the radiologist after the procedure that the patient follow-up with a breast surgeon to see if there are any further recommendations based on the pathology report.  She has not scheduled that appointment yet but plans to in the future.    The patient saw Dr. Brambila, podiatry, for a left ankle dislocation about 2 months ago.  He put her in a boot and she is remained in it as directed since then.  However the patient is having some increased pain and swelling around the ankle.  She denies any additional trauma. Dr. Brambila recommended that if he does not see improved healing at her follow-up appointment in another 2 weeks he would consider an MRI.  The patient is wondering if we should go ahead and get the MRI now.  She has been removing the boot and using a scooter to ambulate.  However that is when she notices the swelling.  Patient has not been using a compression wrap or any splint while on the scooter.       Review of Systems   Constitutional: Negative for activity change, chills, fatigue and fever.   HENT: Negative for hearing loss, swollen glands, tinnitus and trouble swallowing.    Eyes: Negative for pain and visual disturbance.   Respiratory: Negative for cough and shortness of breath.    Cardiovascular: Negative for chest pain, palpitations and leg swelling.   Gastrointestinal: Negative for diarrhea and nausea.   Endocrine: Negative  for polydipsia and polyuria.   Genitourinary: Negative for difficulty urinating and urinary incontinence.   Musculoskeletal: Negative for arthralgias, gait problem and joint swelling.   Skin: Negative for rash.   Allergic/Immunologic: Negative for immunocompromised state.   Neurological: Negative for dizziness, light-headedness and headache.   Hematological: Negative for adenopathy. Does not bruise/bleed easily.   Psychiatric/Behavioral: Negative for dysphoric mood and sleep disturbance.       The following portions of the patient's history were reviewed and updated as appropriate: allergies, current medications, past family history, past medical history, past social history, past surgical history and problem list.    Past Medical History:   Diagnosis Date   • Anemia    • Fatty liver    • Fecal incontinence    • History of transfusion    • Kidney infection 2013    Hospitalized   • Uterine fibroid        Past Surgical History:   Procedure Laterality Date   • CHOLECYSTECTOMY     • COLONOSCOPY  2016    normal per pt   • COLONOSCOPY N/A 3/1/2017    [facility]   • HYSTERECTOMY  07/2009   • RECTAL SURGERY  07/2016    Had a tear of rectum.  Dr. Lee       Family History   Problem Relation Age of Onset   • Hypertension Mother    • Thyroid disease Mother    • Osteoporosis Mother    • Cancer Father         kidney   • Colon cancer Paternal Uncle        Social History     Socioeconomic History   • Marital status:      Spouse name: Not on file   • Number of children: 3   • Years of education: Not on file   • Highest education level: Not on file   Occupational History   • Occupation: instructor   Tobacco Use   • Smoking status: Never Smoker   • Smokeless tobacco: Never Used   Substance and Sexual Activity   • Alcohol use: No   • Drug use: No   • Sexual activity: Yes     Partners: Male     Birth control/protection: None   Social History Narrative    OB/GYN patient since 2009       No current outpatient medications on  "file.    Objective     Vitals:    09/15/20 1542   BP: 106/74   Pulse: 87   Temp: 97.1 °F (36.2 °C)   SpO2: 98%   Weight: 62.6 kg (138 lb)   Height: 151.1 cm (59.49\")       Body mass index is 27.42 kg/m².    No components found for: 2D    Physical Exam  Vitals signs and nursing note reviewed.   Constitutional:       Appearance: She is well-developed.   HENT:      Head: Normocephalic and atraumatic.      Right Ear: Tympanic membrane, ear canal and external ear normal.      Left Ear: Tympanic membrane, ear canal and external ear normal.   Neck:      Musculoskeletal: Normal range of motion and neck supple.   Cardiovascular:      Rate and Rhythm: Normal rate and regular rhythm.      Heart sounds: Normal heart sounds.   Pulmonary:      Effort: Pulmonary effort is normal.      Breath sounds: Normal breath sounds.   Musculoskeletal: Normal range of motion.      Comments: Left ankle is secured in a boot   Skin:     General: Skin is warm.      Findings: Bruising (Mild ecchymosis noted some internal hematoma palpable in the lower segment of the patient's right breast.) present. No erythema or rash.   Neurological:      Mental Status: She is alert and oriented to person, place, and time.   Psychiatric:         Behavior: Behavior normal.         Thought Content: Thought content normal.         Judgment: Judgment normal.         Procedures    Assessment/Plan   Khushboo was seen today for breast pain and foot pain.    Diagnoses and all orders for this visit:    Hematoma of right breast    Chronic pain of left ankle    Needs flu shot  -     FluLaval Quad >6 Months ([id number])    I have recommended the patient use a 4 inch Ace bandage as a compression wrap from toes to mid calf.  I have also recommended ice in the area of pain.  She should contact Dr. Brambila and ask if there is any additional recommendations.  Reassurance was given about the patient's right breast.  She should follow-up with the breast surgeon in the next couple " weeks.    There are no Patient Instructions on file for this visit.

## 2020-09-18 ENCOUNTER — TELEMEDICINE (OUTPATIENT)
Dept: FAMILY MEDICINE CLINIC | Facility: CLINIC | Age: 55
End: 2020-09-18

## 2020-09-18 DIAGNOSIS — J01.11 ACUTE RECURRENT FRONTAL SINUSITIS: Primary | ICD-10-CM

## 2020-09-18 PROCEDURE — 99213 OFFICE O/P EST LOW 20 MIN: CPT | Performed by: NURSE PRACTITIONER

## 2020-09-18 RX ORDER — AMOXICILLIN AND CLAVULANATE POTASSIUM 875; 125 MG/1; MG/1
1 TABLET, FILM COATED ORAL 2 TIMES DAILY
Qty: 14 TABLET | Refills: 0 | Status: SHIPPED | OUTPATIENT
Start: 2020-09-18 | End: 2020-10-26

## 2020-09-18 RX ORDER — CETIRIZINE HYDROCHLORIDE 10 MG/1
10 TABLET ORAL DAILY
Qty: 30 TABLET | Refills: 2 | Status: SHIPPED | OUTPATIENT
Start: 2020-09-18

## 2020-09-18 NOTE — PATIENT INSTRUCTIONS
Sinusitis, Adult  Sinusitis is soreness and swelling (inflammation) of your sinuses. Sinuses are hollow spaces in the bones around your face. They are located:  · Around your eyes.  · In the middle of your forehead.  · Behind your nose.  · In your cheekbones.  Your sinuses and nasal passages are lined with a fluid called mucus. Mucus drains out of your sinuses. Swelling can trap mucus in your sinuses. This lets germs (bacteria, virus, or fungus) grow, which leads to infection. Most of the time, this condition is caused by a virus.  What are the causes?  This condition is caused by:  · Allergies.  · Asthma.  · Germs.  · Things that block your nose or sinuses.  · Growths in the nose (nasal polyps).  · Chemicals or irritants in the air.  · Fungus (rare).  What increases the risk?  You are more likely to develop this condition if:  · You have a weak body defense system (immune system).  · You do a lot of swimming or diving.  · You use nasal sprays too much.  · You smoke.  What are the signs or symptoms?  The main symptoms of this condition are pain and a feeling of pressure around the sinuses. Other symptoms include:  · Stuffy nose (congestion).  · Runny nose (drainage).  · Swelling and warmth in the sinuses.  · Headache.  · Toothache.  · A cough that may get worse at night.  · Mucus that collects in the throat or the back of the nose (postnasal drip).  · Being unable to smell and taste.  · Being very tired (fatigue).  · A fever.  · Sore throat.  · Bad breath.  How is this diagnosed?  This condition is diagnosed based on:  · Your symptoms.  · Your medical history.  · A physical exam.  · Tests to find out if your condition is short-term (acute) or long-term (chronic). Your doctor may:  ? Check your nose for growths (polyps).  ? Check your sinuses using a tool that has a light (endoscope).  ? Check for allergies or germs.  ? Do imaging tests, such as an MRI or CT scan.  How is this treated?  Treatment for this condition  depends on the cause and whether it is short-term or long-term.  · If caused by a virus, your symptoms should go away on their own within 10 days. You may be given medicines to relieve symptoms. They include:  ? Medicines that shrink swollen tissue in the nose.  ? Medicines that treat allergies (antihistamines).  ? A spray that treats swelling of the nostrils.   ? Rinses that help get rid of thick mucus in your nose (nasal saline washes).  · If caused by bacteria, your doctor may wait to see if you will get better without treatment. You may be given antibiotic medicine if you have:  ? A very bad infection.  ? A weak body defense system.  · If caused by growths in the nose, you may need to have surgery.  Follow these instructions at home:  Medicines  · Take, use, or apply over-the-counter and prescription medicines only as told by your doctor. These may include nasal sprays.  · If you were prescribed an antibiotic medicine, take it as told by your doctor. Do not stop taking the antibiotic even if you start to feel better.  Hydrate and humidify    · Drink enough water to keep your pee (urine) pale yellow.  · Use a cool mist humidifier to keep the humidity level in your home above 50%.  · Breathe in steam for 10-15 minutes, 3-4 times a day, or as told by your doctor. You can do this in the bathroom while a hot shower is running.  · Try not to spend time in cool or dry air.  Rest  · Rest as much as you can.  · Sleep with your head raised (elevated).  · Make sure you get enough sleep each night.  General instructions    · Put a warm, moist washcloth on your face 3-4 times a day, or as often as told by your doctor. This will help with discomfort.  · Wash your hands often with soap and water. If there is no soap and water, use hand .  · Do not smoke. Avoid being around people who are smoking (secondhand smoke).  · Keep all follow-up visits as told by your doctor. This is important.  Contact a doctor if:  · You  have a fever.  · Your symptoms get worse.  · Your symptoms do not get better within 10 days.  Get help right away if:  · You have a very bad headache.  · You cannot stop throwing up (vomiting).  · You have very bad pain or swelling around your face or eyes.  · You have trouble seeing.  · You feel confused.  · Your neck is stiff.  · You have trouble breathing.  Summary  · Sinusitis is swelling of your sinuses. Sinuses are hollow spaces in the bones around your face.  · This condition is caused by tissues in your nose that become inflamed or swollen. This traps germs. These can lead to infection.  · If you were prescribed an antibiotic medicine, take it as told by your doctor. Do not stop taking it even if you start to feel better.  · Keep all follow-up visits as told by your doctor. This is important.  This information is not intended to replace advice given to you by your health care provider. Make sure you discuss any questions you have with your health care provider.  Document Released: 06/05/2009 Document Revised: 05/20/2019 Document Reviewed: 05/20/2019  [employer] Patient Education © 2020 [employer]

## 2020-09-18 NOTE — PROGRESS NOTES
Subjective   Khushboo Freeman is a 54 y.o. female.     Chief Complaint   Patient presents with   • Eye Problem     Eye pressure, and drainage   • Sinusitis        History of Present Illness     Patient presents today via telehealth video visit.  She reports sinus congestion that started on Monday.   She denies any fever, just the pain Having ear pain, and pressure over eyes,  She has had a slight cough for over 2 weeks.    She went to the dentist this morning and they told her it wasn't her teeth.       She also reports eye pressure and drainage    The following portions of the patient's history were reviewed and updated as appropriate: allergies, current medications, past family history, past medical history, past social history, past surgical history and problem list.    Past Medical History:   Diagnosis Date   • Anemia    • Fatty liver    • Fecal incontinence    • History of transfusion    • Kidney infection 2013    Hospitalized   • Uterine fibroid        Past Surgical History:   Procedure Laterality Date   • CHOLECYSTECTOMY     • COLONOSCOPY  2016    normal per pt   • COLONOSCOPY N/A 3/1/2017    [facility]   • HYSTERECTOMY  07/2009   • RECTAL SURGERY  07/2016    Had a tear of rectum.  Dr. Lee       Family History   Problem Relation Age of Onset   • Hypertension Mother    • Thyroid disease Mother    • Osteoporosis Mother    • Cancer Father         kidney   • Colon cancer Paternal Uncle        Social History     Socioeconomic History   • Marital status:      Spouse name: Not on file   • Number of children: 3   • Years of education: Not on file   • Highest education level: Not on file   Occupational History   • Occupation: instructor   Tobacco Use   • Smoking status: Never Smoker   • Smokeless tobacco: Never Used   Substance and Sexual Activity   • Alcohol use: No   • Drug use: No   • Sexual activity: Yes     Partners: Male     Birth control/protection: None   Social History Narrative    OB/GYN patient  since 2009         Current Outpatient Medications:   •  amoxicillin-clavulanate (AUGMENTIN) 875-125 MG per tablet, Take 1 tablet by mouth 2 (Two) Times a Day., Disp: 14 tablet, Rfl: 0  •  cetirizine (zyrTEC) 10 MG tablet, Take 1 tablet by mouth Daily., Disp: 30 tablet, Rfl: 2    Review of Systems   Constitutional: Negative for fatigue and fever.   HENT: Positive for congestion, ear discharge, ear pain, rhinorrhea and sinus pressure. Negative for sore throat.    Respiratory: Positive for cough. Negative for shortness of breath and wheezing.    Cardiovascular: Negative for chest pain.   Gastrointestinal: Negative for abdominal pain, constipation, diarrhea, nausea and vomiting.   Genitourinary: Negative for dysuria and urgency.   Skin: Negative.    Neurological: Positive for dizziness and headache.       Objective   There were no vitals filed for this visit.  There is no height or weight on file to calculate BMI.  Physical Exam    Telephone visit, she was unable to connect to telehealth visit.     Assessment/Plan   Khushboo was seen today for eye problem and sinusitis.    Diagnoses and all orders for this visit:    Acute recurrent frontal sinusitis    Other orders  -     cetirizine (zyrTEC) 10 MG tablet; Take 1 tablet by mouth Daily.  -     amoxicillin-clavulanate (AUGMENTIN) 875-125 MG per tablet; Take 1 tablet by mouth 2 (Two) Times a Day.        Start antibiotic and allergy medication.  If ongoing symptoms notify provider.       Time spent on visit was 9 minutes         Patient Instructions   Sinusitis, Adult  Sinusitis is soreness and swelling (inflammation) of your sinuses. Sinuses are hollow spaces in the bones around your face. They are located:  · Around your eyes.  · In the middle of your forehead.  · Behind your nose.  · In your cheekbones.  Your sinuses and nasal passages are lined with a fluid called mucus. Mucus drains out of your sinuses. Swelling can trap mucus in your sinuses. This lets germs  (bacteria, virus, or fungus) grow, which leads to infection. Most of the time, this condition is caused by a virus.  What are the causes?  This condition is caused by:  · Allergies.  · Asthma.  · Germs.  · Things that block your nose or sinuses.  · Growths in the nose (nasal polyps).  · Chemicals or irritants in the air.  · Fungus (rare).  What increases the risk?  You are more likely to develop this condition if:  · You have a weak body defense system (immune system).  · You do a lot of swimming or diving.  · You use nasal sprays too much.  · You smoke.  What are the signs or symptoms?  The main symptoms of this condition are pain and a feeling of pressure around the sinuses. Other symptoms include:  · Stuffy nose (congestion).  · Runny nose (drainage).  · Swelling and warmth in the sinuses.  · Headache.  · Toothache.  · A cough that may get worse at night.  · Mucus that collects in the throat or the back of the nose (postnasal drip).  · Being unable to smell and taste.  · Being very tired (fatigue).  · A fever.  · Sore throat.  · Bad breath.  How is this diagnosed?  This condition is diagnosed based on:  · Your symptoms.  · Your medical history.  · A physical exam.  · Tests to find out if your condition is short-term (acute) or long-term (chronic). Your doctor may:  ? Check your nose for growths (polyps).  ? Check your sinuses using a tool that has a light (endoscope).  ? Check for allergies or germs.  ? Do imaging tests, such as an MRI or CT scan.  How is this treated?  Treatment for this condition depends on the cause and whether it is short-term or long-term.  · If caused by a virus, your symptoms should go away on their own within 10 days. You may be given medicines to relieve symptoms. They include:  ? Medicines that shrink swollen tissue in the nose.  ? Medicines that treat allergies (antihistamines).  ? A spray that treats swelling of the nostrils.   ? Rinses that help get rid of thick mucus in your nose  (nasal saline washes).  · If caused by bacteria, your doctor may wait to see if you will get better without treatment. You may be given antibiotic medicine if you have:  ? A very bad infection.  ? A weak body defense system.  · If caused by growths in the nose, you may need to have surgery.  Follow these instructions at home:  Medicines  · Take, use, or apply over-the-counter and prescription medicines only as told by your doctor. These may include nasal sprays.  · If you were prescribed an antibiotic medicine, take it as told by your doctor. Do not stop taking the antibiotic even if you start to feel better.  Hydrate and humidify    · Drink enough water to keep your pee (urine) pale yellow.  · Use a cool mist humidifier to keep the humidity level in your home above 50%.  · Breathe in steam for 10-15 minutes, 3-4 times a day, or as told by your doctor. You can do this in the bathroom while a hot shower is running.  · Try not to spend time in cool or dry air.  Rest  · Rest as much as you can.  · Sleep with your head raised (elevated).  · Make sure you get enough sleep each night.  General instructions    · Put a warm, moist washcloth on your face 3-4 times a day, or as often as told by your doctor. This will help with discomfort.  · Wash your hands often with soap and water. If there is no soap and water, use hand .  · Do not smoke. Avoid being around people who are smoking (secondhand smoke).  · Keep all follow-up visits as told by your doctor. This is important.  Contact a doctor if:  · You have a fever.  · Your symptoms get worse.  · Your symptoms do not get better within 10 days.  Get help right away if:  · You have a very bad headache.  · You cannot stop throwing up (vomiting).  · You have very bad pain or swelling around your face or eyes.  · You have trouble seeing.  · You feel confused.  · Your neck is stiff.  · You have trouble breathing.  Summary  · Sinusitis is swelling of your sinuses. Sinuses  are hollow spaces in the bones around your face.  · This condition is caused by tissues in your nose that become inflamed or swollen. This traps germs. These can lead to infection.  · If you were prescribed an antibiotic medicine, take it as told by your doctor. Do not stop taking it even if you start to feel better.  · Keep all follow-up visits as told by your doctor. This is important.  This information is not intended to replace advice given to you by your health care provider. Make sure you discuss any questions you have with your health care provider.  Document Released: 06/05/2009 Document Revised: 05/20/2019 Document Reviewed: 05/20/2019  Elsevier Patient Education © 2020 [employer]

## 2020-10-09 ENCOUNTER — HOSPITAL ENCOUNTER (OUTPATIENT)
Facility: HOSPITAL | Age: 55
Setting detail: HOSPITAL OUTPATIENT SURGERY
End: 2020-10-09
Attending: SURGERY | Admitting: SURGERY

## 2020-10-09 ENCOUNTER — OFFICE VISIT (OUTPATIENT)
Dept: SURGERY | Facility: CLINIC | Age: 55
End: 2020-10-09

## 2020-10-09 VITALS
HEIGHT: 60 IN | SYSTOLIC BLOOD PRESSURE: 118 MMHG | DIASTOLIC BLOOD PRESSURE: 72 MMHG | RESPIRATION RATE: 16 BRPM | WEIGHT: 138 LBS | HEART RATE: 72 BPM | BODY MASS INDEX: 27.09 KG/M2

## 2020-10-09 DIAGNOSIS — D24.1 INTRADUCTAL PAPILLOMA OF BREAST, RIGHT: Primary | ICD-10-CM

## 2020-10-09 PROCEDURE — 99204 OFFICE O/P NEW MOD 45 MIN: CPT | Performed by: SURGERY

## 2020-10-09 NOTE — H&P
Khushboo Freeman 54 y.o. female presents @ the req of Dr. Galvez for eval of hematoma RIGHT breast.  Pt reports the hematoma was much larger following biopsy, but has decreased significantly.   Chief Complaint   Patient presents with   • HEMATOMA     RIGHT BREAST             HPI   This very pleasant patient presents today with an abnormal stereotactic biopsy and a hematoma of the breast.  She gave birth to her first child at age 24.  She had three children and breast fed all of them.  She started menses at age 11 and had a surgical menopause 11 years ago.  She has never had a breast surgery or mass in the past.  She does not smoke or use tobacco products.  She has no family history of breast cancer but her father's side of the family has cancer.  She has no fevers or chills.  Her only complaint is a hematoma from the biopsy.      Review of Systems   All other systems reviewed and are negative.            Current Outpatient Medications:   •  amoxicillin-clavulanate (AUGMENTIN) 875-125 MG per tablet, Take 1 tablet by mouth 2 (Two) Times a Day., Disp: 14 tablet, Rfl: 0  •  cetirizine (zyrTEC) 10 MG tablet, Take 1 tablet by mouth Daily., Disp: 30 tablet, Rfl: 2        Allergies   Allergen Reactions   • Oxycodone-Acetaminophen Hives and Itching   • Prednisone Itching           Past Medical History:   Diagnosis Date   • Anemia    • Fatty liver    • Fecal incontinence    • History of transfusion    • Kidney infection 2013    Hospitalized   • Uterine fibroid            Past Surgical History:   Procedure Laterality Date   • CHOLECYSTECTOMY     • COLONOSCOPY  2016    normal per pt   • COLONOSCOPY N/A 3/1/2017    [facility]   • HYSTERECTOMY  07/2009   • RECTAL SURGERY  07/2016    Had a tear of rectum.  Dr. Lee           Social History     Tobacco Use   • Smoking status: Never Smoker   • Smokeless tobacco: Never Used   Substance Use Topics   • Alcohol use: No   • Drug use: No           Immunization History   Administered Date(s)  "Administered   • Flulaval/Fluarix Quad 09/15/2020           Physical Exam  Vitals signs and nursing note reviewed.   Constitutional:       Appearance: Normal appearance.   HENT:      Head: Normocephalic and atraumatic.   Cardiovascular:      Rate and Rhythm: Normal rate and regular rhythm.   Pulmonary:      Effort: Pulmonary effort is normal.      Breath sounds: Normal breath sounds.   Abdominal:      General: Bowel sounds are normal.      Palpations: Abdomen is soft.   Musculoskeletal:         General: No swelling or tenderness.   Skin:     General: Skin is warm and dry.   Neurological:      General: No focal deficit present.      Mental Status: She is alert and oriented to person, place, and time.   Psychiatric:         Mood and Affect: Mood normal.         Behavior: Behavior normal.         Debilities/Disabilities Identified: None    Emotional Behavior: Appropriate      /72   Pulse 72   Resp 16   Ht 152.4 cm (60\")   Wt 62.6 kg (138 lb)   LMP  (LMP Unknown)   BMI 26.95 kg/m²         Khushboo was seen today for hematoma.    Diagnoses and all orders for this visit:    Intraductal papilloma of breast, right    We discussed alternating ice with heat for the hematoma.  We discussed that this lesion is most likely benign but it does slightly increase her risk of breast cancer.  We discussed performing an excision of breast mass with preoperative localization.  We discussed the benefits and risks which she appeared to understand and is willing to proceed.    Thank you for allowing me to participate in the care of this interesting patient.          "

## 2020-10-21 ENCOUNTER — TELEPHONE (OUTPATIENT)
Dept: SURGERY | Facility: CLINIC | Age: 55
End: 2020-10-21

## 2020-10-26 ENCOUNTER — APPOINTMENT (OUTPATIENT)
Dept: PREADMISSION TESTING | Facility: HOSPITAL | Age: 55
End: 2020-10-26

## 2020-10-26 VITALS
HEIGHT: 59 IN | BODY MASS INDEX: 26.96 KG/M2 | SYSTOLIC BLOOD PRESSURE: 112 MMHG | OXYGEN SATURATION: 96 % | DIASTOLIC BLOOD PRESSURE: 72 MMHG | HEART RATE: 80 BPM | RESPIRATION RATE: 16 BRPM | WEIGHT: 133.7 LBS

## 2020-10-26 LAB
DEPRECATED RDW RBC AUTO: 42.2 FL (ref 37–54)
ERYTHROCYTE [DISTWIDTH] IN BLOOD BY AUTOMATED COUNT: 12 % (ref 12.3–15.4)
HCT VFR BLD AUTO: 44.5 % (ref 34–46.6)
HGB BLD-MCNC: 14.2 G/DL (ref 12–15.9)
MCH RBC QN AUTO: 30 PG (ref 26.6–33)
MCHC RBC AUTO-ENTMCNC: 31.9 G/DL (ref 31.5–35.7)
MCV RBC AUTO: 93.9 FL (ref 79–97)
PLATELET # BLD AUTO: 195 10*3/MM3 (ref 140–450)
PMV BLD AUTO: 9.8 FL (ref 6–12)
RBC # BLD AUTO: 4.74 10*6/MM3 (ref 3.77–5.28)
WBC # BLD AUTO: 3.36 10*3/MM3 (ref 3.4–10.8)

## 2020-10-26 PROCEDURE — 36415 COLL VENOUS BLD VENIPUNCTURE: CPT

## 2020-10-26 PROCEDURE — C9803 HOPD COVID-19 SPEC COLLECT: HCPCS

## 2020-10-26 PROCEDURE — U0004 COV-19 TEST NON-CDC HGH THRU: HCPCS | Performed by: SURGERY

## 2020-10-26 PROCEDURE — 85027 COMPLETE CBC AUTOMATED: CPT | Performed by: SURGERY

## 2020-10-26 NOTE — DISCHARGE INSTRUCTIONS
PRE-ADMISSION TESTING INSTRUCTIONS FOR ADULTS    Take these medications the morning of surgery with a small sip of water: nothing      No aspirin, advil, aleve, ibuprofen, naproxen, diet pills, decongestants, or herbal/vitamins for a week prior to surgery.    General Instructions:    • Do not eat solid food or drink after midnight the night before surgery.  No gum, mints, or hard candy after midnight the night before surgery.    • Patients who avoid smoking, chewing tobacco and alcohol for 4 weeks prior to surgery have a reduced risk of post-operative complications.  If at all possible, quit smoking as many days before surgery as you can.    • Do not smoke, use chewing tobacco or drink alcohol the day of surgery    • Bring your C-PAP/ BI-PAP machine if you use one.  • Wear clean comfortable clothes and socks.  • Do not wear contact lenses, lotion, deodorant, or make-up.  Bring a case for your glasses if applicable. You may brush your teeth the morning of surgery.  • You may wear dentures/partials, do not put adhesive/glue on them.  • Bring crutches or walker if applicable.  • Leave all other jewelry and valuables at home.      Preventing a Surgical Site Infection:    • Shower the night before and on the morning of surgery using the chlorhexidine soap you were given.  Use a clean washcloth with the soap.  Place clean sheets on your bed after showering the night before surgery. Do not use the CHG soap on your hair, face, or private areas. Wash your body gently for five (5) minutes. Do not scrub your skin.  Dry with a clean towel and dress in clean clothing.    • Do not shave the surgical area for 10 days-2 weeks prior to surgery  because the razor can irritate skin and make it easier to develop an infection.  • Make sure you, your family, and all healthcare providers clean their hands with soap and water or an alcohol based hand  before caring for you or your wound.      Day of surgery:    Your surgeon’s  office will advise you of your arrival time for the day of surgery.    Upon arrival, a Pre-op nurse and Anesthesia provider will review your health history, obtain vital signs, and answer questions you may have.  The only belongings needed at this time will be your home medications and if applicable your C-PAP/BI-PAP machine.  If you are staying overnight your family can leave the rest of your belongings in the car and bring them to your room later.  A Pre-op nurse will start an IV and you may receive medication in preparation for surgery, including something to help you relax.  Your family will be able to see you in the Pre-op area.  While you are in surgery your family should notify the waiting room  if they leave the waiting room area and provide a contact phone number.    IF you have any questions, you can call the Pre-Admission Department at [phone] or your surgeon's office.  Notify your surgeon if  you become sick, have a fever, productive cough, or cannot be here the day of surgery    Please be aware that surgery does come with discomfort.  We want to make every effort to control your discomfort so please discuss any uncontrolled symptoms with your nurse.   Your doctor will most likely have prescribed pain medications.      If you are going home after surgery, you will receive individualized written care instructions before being discharged.  A responsible adult (over the age of 18) must drive you to and from the hospital on the day of your surgery and stay with you for 24 hours after anesthesia.    If you are staying overnight following surgery, you will be transported to your hospital room following the recovery period.  [facility] has all private rooms.    You may receive a survey regarding the care you received. Your feedback is very important and will be used to collect the necessary data to help us to continue to provide excellent care.     Deductibles and co-payments are  collected on the day of service. Please be prepared to pay the required co-pay, deductible or deposit on the day of service as defined by your plan.

## 2020-10-26 NOTE — PAT
Pt here for PAT visit.  Pre-op tests completed, chg soap given, and instructions reviewed.  Instructed nothing to eat/drink after midnight night prior, voiced understanding.  COVID pending.

## 2020-10-27 LAB — SARS-COV-2 RNA RESP QL NAA+PROBE: DETECTED

## 2020-10-28 ENCOUNTER — PREP FOR SURGERY (OUTPATIENT)
Dept: OTHER | Facility: HOSPITAL | Age: 55
End: 2020-10-28

## 2020-10-28 DIAGNOSIS — N63.10 BREAST MASS, RIGHT: Primary | ICD-10-CM

## 2020-10-28 RX ORDER — CEFAZOLIN SODIUM 2 G/50ML
2 SOLUTION INTRAVENOUS ONCE
Status: CANCELLED | OUTPATIENT
Start: 2020-10-28 | End: 2020-10-28

## 2020-10-28 NOTE — PROGRESS NOTES
Khushboo tested positive for COVID yesterday 10/26.  She has no symptoms.  I spoke with Dr. Landon who recommended postponing her surgery for 10 days and rescheduling her without another test.

## 2020-10-29 ENCOUNTER — HOSPITAL ENCOUNTER (OUTPATIENT)
Dept: ULTRASOUND IMAGING | Facility: HOSPITAL | Age: 55
End: 2020-10-29

## 2020-11-16 ENCOUNTER — TELEPHONE (OUTPATIENT)
Dept: FAMILY MEDICINE CLINIC | Facility: CLINIC | Age: 55
End: 2020-11-16

## 2020-11-16 NOTE — TELEPHONE ENCOUNTER
----- Message from Wicho Disla sent at 11/16/2020 12:54 PM EST -----    ----- Message -----  From: Jammie Acharya MA  Sent: 11/16/2020  12:44 PM EST  To: Wicho Disla    WHAT PATIENT IS THIS IN REGARDS TO? THERE IS NOBODY ATTACHED.   ----- Message -----  From: Mahad Quick RegSched Rep  Sent: 11/16/2020  12:28 PM EST  To: [facility]    PT IS HAVING SURGERY ON 11/18/2020 & GOT COVID NASAL SWABBED FOR PRE-OP BUT SHE WANTS AN ORDER FOR A COVID BLOOD DRAW AND THEY TOLD HER IF SHE WANTED A BLOOD DRAW COVID TEST SHE WOULD HAVE TO GET AN ORDER FORM HER PROVIDER.

## 2020-11-16 NOTE — TELEPHONE ENCOUNTER
Left message for patient. The COVID blood draw will not determine if she has COVID 19 at the moment, only if she has had it previously. The test to determine if she has it right now is the nasal or oral swab that was ordered by the surgeon.

## 2020-11-17 ENCOUNTER — TELEPHONE (OUTPATIENT)
Dept: SURGERY | Facility: CLINIC | Age: 55
End: 2020-11-17

## 2020-11-17 NOTE — TELEPHONE ENCOUNTER
Notified per Cassy JOYNER, that pt told PAT nurse she was cx'ing exc breast mass 11/19/2020 due to a previously schedule foot surgery.  Phone call placed to pt and she confirms she is having foot surgery 11/18/2020 and desires to postpone exc breast mass until 12/10/2020.  Pt resched and instructed to arrive @ 10:00 am.    Ary Arriola, notified and she will resched needle loc for this date.

## 2020-11-19 ENCOUNTER — HOSPITAL ENCOUNTER (OUTPATIENT)
Dept: ULTRASOUND IMAGING | Facility: HOSPITAL | Age: 55
End: 2020-11-19

## 2020-12-10 ENCOUNTER — APPOINTMENT (OUTPATIENT)
Dept: ULTRASOUND IMAGING | Facility: HOSPITAL | Age: 55
End: 2020-12-10

## 2020-12-23 ENCOUNTER — TELEPHONE (OUTPATIENT)
Dept: SURGERY | Facility: CLINIC | Age: 55
End: 2020-12-23

## 2020-12-23 NOTE — TELEPHONE ENCOUNTER
Pt called and req a work note to be off work 01/11/2021-01/29/2021 due to COVID test and surgery.  Pt req letter to be emailed to: [email or web address].  Email address R/V.    Letter sent.  Copy in chart

## 2021-01-04 ENCOUNTER — TELEPHONE (OUTPATIENT)
Dept: SURGERY | Facility: CLINIC | Age: 56
End: 2021-01-04

## 2021-01-04 NOTE — TELEPHONE ENCOUNTER
Pt had surgery approx 6 weeks ago; states she had an allergie reaction to something in the anesthesia. She broke out in a rash that took her 2 weeks to get over.  She wanted to talk with you about this.

## 2021-01-07 NOTE — TELEPHONE ENCOUNTER
Spoke with pt and she is very anxious about anesthesia.  I asked pt for name of medications that she had a reaction to and she did not have that information.  I asked pt to obtain that information and bring it to the hospital the day of surg to discuss with anesthesia.  Pt is agreeable. PT reassured that she would have time to discuss all concerns prior to surg.

## 2021-01-11 ENCOUNTER — APPOINTMENT (OUTPATIENT)
Dept: PREADMISSION TESTING | Facility: HOSPITAL | Age: 56
End: 2021-01-11

## 2021-01-11 VITALS
BODY MASS INDEX: 26.21 KG/M2 | RESPIRATION RATE: 16 BRPM | OXYGEN SATURATION: 99 % | SYSTOLIC BLOOD PRESSURE: 133 MMHG | HEART RATE: 75 BPM | HEIGHT: 59 IN | WEIGHT: 130 LBS | DIASTOLIC BLOOD PRESSURE: 68 MMHG

## 2021-01-11 LAB
DEPRECATED RDW RBC AUTO: 44.3 FL (ref 37–54)
ERYTHROCYTE [DISTWIDTH] IN BLOOD BY AUTOMATED COUNT: 13 % (ref 12.3–15.4)
HCT VFR BLD AUTO: 43.3 % (ref 34–46.6)
HGB BLD-MCNC: 14 G/DL (ref 12–15.9)
MCH RBC QN AUTO: 30.1 PG (ref 26.6–33)
MCHC RBC AUTO-ENTMCNC: 32.3 G/DL (ref 31.5–35.7)
MCV RBC AUTO: 93.1 FL (ref 79–97)
PLATELET # BLD AUTO: 180 10*3/MM3 (ref 140–450)
PMV BLD AUTO: 10.3 FL (ref 6–12)
RBC # BLD AUTO: 4.65 10*6/MM3 (ref 3.77–5.28)
WBC # BLD AUTO: 4.51 10*3/MM3 (ref 3.4–10.8)

## 2021-01-11 PROCEDURE — 36415 COLL VENOUS BLD VENIPUNCTURE: CPT

## 2021-01-11 PROCEDURE — 85027 COMPLETE CBC AUTOMATED: CPT | Performed by: SURGERY

## 2021-01-11 NOTE — DISCHARGE INSTRUCTIONS
NOTHING TO EAT OR DRINK AFTER MIDNIGHT THE NIGHT PRIOR TO SURGERY    PRE-ADMISSION TESTING INSTRUCTIONS FOR ADULTS    Take these medications the morning of surgery with a small sip of water: NONE      No aspirin, advil, aleve, ibuprofen, naproxen, diet pills, decongestants, or herbal/vitamins for a week prior to surgery.    General Instructions:    • Do not eat solid food after midnight the night before surgery.  No gum, mints, or hard candy after midnight the night before surgery.    •      • Patients who avoid smoking, chewing tobacco and alcohol for 4 weeks prior to surgery have a reduced risk of post-operative complications.  If at all possible, quit smoking as many days before surgery as you can.    • Do not smoke, use chewing tobacco or drink alcohol the day of surgery    • Bring your C-PAP/ BI-PAP machine if you use one.  • Wear clean comfortable clothes and socks.  • Do not wear contact lenses, lotion, deodorant, or make-up.  Bring a case for your glasses if applicable. You may brush your teeth the morning of surgery.  • You may wear dentures/partials, do not put adhesive/glue on them.  • Bring crutches or walker if applicable.  • Leave all other jewelry and valuables at home.      Preventing a Surgical Site Infection:    • Shower the night before and on the morning of surgery using the chlorhexidine soap you were given.  Use a clean washcloth with the soap.  Place clean sheets on your bed after showering the night before surgery. Do not use the CHG soap on your hair, face, or private areas. Wash your body gently for five (5) minutes. Do not scrub your skin.  Dry with a clean towel and dress in clean clothing.    • Do not shave the surgical area for 10 days-2 weeks prior to surgery  because the razor can irritate skin and make it easier to develop an infection.  • Make sure you, your family, and all healthcare providers clean their hands with soap and water or an alcohol based hand  before caring  for you or your wound.      Day of surgery:    Your surgeon’s office will advise you of your arrival time for the day of surgery.    Upon arrival, a Pre-op nurse and Anesthesia provider will review your health history, obtain vital signs, and answer questions you may have.  The only belongings needed at this time will be your home medications and if applicable your C-PAP/BI-PAP machine.  If you are staying overnight your family can leave the rest of your belongings in the car and bring them to your room later.  A Pre-op nurse will start an IV and you may receive medication in preparation for surgery, including something to help you relax.  Your family will be able to see you in the Pre-op area.  While you are in surgery your family should notify the waiting room  if they leave the waiting room area and provide a contact phone number.    IF you have any questions, you can call the Pre-Admission Department at [phone] or your surgeon's office.  Notify your surgeon if  you become sick, have a fever, productive cough, or cannot be here the day of surgery    Please be aware that surgery does come with discomfort.  We want to make every effort to control your discomfort so please discuss any uncontrolled symptoms with your nurse.   Your doctor will most likely have prescribed pain medications.      If you are going home after surgery, you will receive individualized written care instructions before being discharged.  A responsible adult (over the age of 18) must drive you to and from the hospital on the day of your surgery and stay with you for 24 hours after anesthesia.    If you are staying overnight following surgery, you will be transported to your hospital room following the recovery period.  [facility] has all private rooms.    You may receive a survey regarding the care you received. Your feedback is very important and will be used to collect the necessary data to help us to continue  to provide excellent care.     Deductibles and co-payments are collected on the day of service. Please be prepared to pay the required co-pay, deductible or deposit on the day of service as defined by your plan.

## 2021-01-11 NOTE — PAT
Patient here for PAT visit, lab complete.patient had positive covid test on 10/26, not required to be repeated. Patient unsure of allergies, questionable antibiotic used with her last surgery, patient thought she might have had reaction to anesthesia. Abbey PEREZ here to visit with patient, after much review of records from [facility], & talking with JEAN Bocanegra Crna, determined 2 antibiotics possibly had reaction to, PCN & Clindamycin.

## 2021-01-13 ENCOUNTER — ANESTHESIA EVENT (OUTPATIENT)
Dept: PERIOP | Facility: HOSPITAL | Age: 56
End: 2021-01-13

## 2021-01-14 ENCOUNTER — APPOINTMENT (OUTPATIENT)
Dept: MAMMOGRAPHY | Facility: HOSPITAL | Age: 56
End: 2021-01-14

## 2021-01-14 ENCOUNTER — HOSPITAL ENCOUNTER (OUTPATIENT)
Facility: HOSPITAL | Age: 56
Setting detail: HOSPITAL OUTPATIENT SURGERY
Discharge: HOME | End: 2021-01-14
Attending: SURGERY | Admitting: SURGERY

## 2021-01-14 ENCOUNTER — ANESTHESIA (OUTPATIENT)
Dept: PERIOP | Facility: HOSPITAL | Age: 56
End: 2021-01-14

## 2021-01-14 ENCOUNTER — HOSPITAL ENCOUNTER (OUTPATIENT)
Dept: ULTRASOUND IMAGING | Facility: HOSPITAL | Age: 56
Discharge: HOME | End: 2021-01-14

## 2021-01-14 VITALS
WEIGHT: 131.1 LBS | OXYGEN SATURATION: 95 % | BODY MASS INDEX: 26.48 KG/M2 | HEART RATE: 69 BPM | DIASTOLIC BLOOD PRESSURE: 62 MMHG | TEMPERATURE: 97.7 F | RESPIRATION RATE: 15 BRPM | SYSTOLIC BLOOD PRESSURE: 116 MMHG

## 2021-01-14 DIAGNOSIS — N63.10 BREAST MASS, RIGHT: ICD-10-CM

## 2021-01-14 PROCEDURE — 25010000003 CEFAZOLIN SODIUM-DEXTROSE 2-3 GM-%(50ML) RECONSTITUTED SOLUTION: Performed by: SURGERY

## 2021-01-14 PROCEDURE — 76098 X-RAY EXAM SURGICAL SPECIMEN: CPT

## 2021-01-14 PROCEDURE — 25010000002 PROPOFOL 10 MG/ML EMULSION: Performed by: NURSE ANESTHETIST, CERTIFIED REGISTERED

## 2021-01-14 PROCEDURE — 25010000003 LIDOCAINE 1 % SOLUTION: Performed by: SURGERY

## 2021-01-14 PROCEDURE — 25010000002 ONDANSETRON PER 1 MG: Performed by: NURSE ANESTHETIST, CERTIFIED REGISTERED

## 2021-01-14 PROCEDURE — 19125 EXCISION BREAST LESION: CPT | Performed by: SURGERY

## 2021-01-14 PROCEDURE — 25010000002 FENTANYL CITRATE (PF) 100 MCG/2ML SOLUTION: Performed by: NURSE ANESTHETIST, CERTIFIED REGISTERED

## 2021-01-14 PROCEDURE — 88307 TISSUE EXAM BY PATHOLOGIST: CPT | Performed by: SURGERY

## 2021-01-14 PROCEDURE — 25010000002 DIPHENHYDRAMINE PER 50 MG: Performed by: NURSE ANESTHETIST, CERTIFIED REGISTERED

## 2021-01-14 PROCEDURE — C1769 GUIDE WIRE: HCPCS

## 2021-01-14 PROCEDURE — 76942 ECHO GUIDE FOR BIOPSY: CPT

## 2021-01-14 PROCEDURE — 25010000002 MIDAZOLAM PER 1MG: Performed by: NURSE ANESTHETIST, CERTIFIED REGISTERED

## 2021-01-14 DEVICE — LIGACLIP MCA MULTIPLE CLIP APPLIERS, 20 MEDIUM CLIPS
Type: IMPLANTABLE DEVICE | Site: BREAST | Status: FUNCTIONAL
Brand: LIGACLIP

## 2021-01-14 RX ORDER — FAMOTIDINE 10 MG/ML
20 INJECTION, SOLUTION INTRAVENOUS
Status: DISCONTINUED | OUTPATIENT
Start: 2021-01-14 | End: 2021-01-14 | Stop reason: HOSPADM

## 2021-01-14 RX ORDER — ONDANSETRON 2 MG/ML
4 INJECTION INTRAMUSCULAR; INTRAVENOUS ONCE AS NEEDED
Status: COMPLETED | OUTPATIENT
Start: 2021-01-14 | End: 2021-01-14

## 2021-01-14 RX ORDER — SODIUM CHLORIDE, SODIUM LACTATE, POTASSIUM CHLORIDE, CALCIUM CHLORIDE 600; 310; 30; 20 MG/100ML; MG/100ML; MG/100ML; MG/100ML
9 INJECTION, SOLUTION INTRAVENOUS CONTINUOUS
Status: DISCONTINUED | OUTPATIENT
Start: 2021-01-14 | End: 2021-01-14 | Stop reason: HOSPADM

## 2021-01-14 RX ORDER — PROPOFOL 10 MG/ML
VIAL (ML) INTRAVENOUS AS NEEDED
Status: DISCONTINUED | OUTPATIENT
Start: 2021-01-14 | End: 2021-01-14 | Stop reason: SURG

## 2021-01-14 RX ORDER — LIDOCAINE HYDROCHLORIDE 10 MG/ML
0.5 INJECTION, SOLUTION EPIDURAL; INFILTRATION; INTRACAUDAL; PERINEURAL ONCE AS NEEDED
Status: DISCONTINUED | OUTPATIENT
Start: 2021-01-14 | End: 2021-01-14 | Stop reason: HOSPADM

## 2021-01-14 RX ORDER — DEXMEDETOMIDINE HYDROCHLORIDE 100 UG/ML
INJECTION, SOLUTION INTRAVENOUS AS NEEDED
Status: DISCONTINUED | OUTPATIENT
Start: 2021-01-14 | End: 2021-01-14 | Stop reason: SURG

## 2021-01-14 RX ORDER — KETAMINE HYDROCHLORIDE 100 MG/ML
INJECTION INTRAMUSCULAR; INTRAVENOUS AS NEEDED
Status: DISCONTINUED | OUTPATIENT
Start: 2021-01-14 | End: 2021-01-14 | Stop reason: SURG

## 2021-01-14 RX ORDER — SODIUM CHLORIDE 0.9 % (FLUSH) 0.9 %
10 SYRINGE (ML) INJECTION AS NEEDED
Status: DISCONTINUED | OUTPATIENT
Start: 2021-01-14 | End: 2021-01-14 | Stop reason: HOSPADM

## 2021-01-14 RX ORDER — SODIUM CHLORIDE 0.9 % (FLUSH) 0.9 %
10 SYRINGE (ML) INJECTION EVERY 12 HOURS SCHEDULED
Status: DISCONTINUED | OUTPATIENT
Start: 2021-01-14 | End: 2021-01-14 | Stop reason: HOSPADM

## 2021-01-14 RX ORDER — FENTANYL CITRATE 50 UG/ML
25 INJECTION, SOLUTION INTRAMUSCULAR; INTRAVENOUS
Status: DISCONTINUED | OUTPATIENT
Start: 2021-01-14 | End: 2021-01-14 | Stop reason: HOSPADM

## 2021-01-14 RX ORDER — ACETAMINOPHEN 500 MG
500 TABLET ORAL ONCE
Status: COMPLETED | OUTPATIENT
Start: 2021-01-14 | End: 2021-01-14

## 2021-01-14 RX ORDER — SCOLOPAMINE TRANSDERMAL SYSTEM 1 MG/1
1 PATCH, EXTENDED RELEASE TRANSDERMAL CONTINUOUS
Status: DISCONTINUED | OUTPATIENT
Start: 2021-01-14 | End: 2021-01-14 | Stop reason: HOSPADM

## 2021-01-14 RX ORDER — SODIUM CHLORIDE, SODIUM LACTATE, POTASSIUM CHLORIDE, CALCIUM CHLORIDE 600; 310; 30; 20 MG/100ML; MG/100ML; MG/100ML; MG/100ML
100 INJECTION, SOLUTION INTRAVENOUS CONTINUOUS
Status: DISCONTINUED | OUTPATIENT
Start: 2021-01-14 | End: 2021-01-14 | Stop reason: HOSPADM

## 2021-01-14 RX ORDER — MAGNESIUM HYDROXIDE 1200 MG/15ML
LIQUID ORAL AS NEEDED
Status: DISCONTINUED | OUTPATIENT
Start: 2021-01-14 | End: 2021-01-14 | Stop reason: HOSPADM

## 2021-01-14 RX ORDER — LIDOCAINE HYDROCHLORIDE 10 MG/ML
5 INJECTION, SOLUTION INFILTRATION; PERINEURAL ONCE
Status: COMPLETED | OUTPATIENT
Start: 2021-01-14 | End: 2021-01-14

## 2021-01-14 RX ORDER — LIDOCAINE HYDROCHLORIDE 20 MG/ML
INJECTION, SOLUTION INFILTRATION; PERINEURAL AS NEEDED
Status: DISCONTINUED | OUTPATIENT
Start: 2021-01-14 | End: 2021-01-14 | Stop reason: SURG

## 2021-01-14 RX ORDER — DIPHENHYDRAMINE HYDROCHLORIDE 50 MG/ML
12.5 INJECTION INTRAMUSCULAR; INTRAVENOUS ONCE
Status: COMPLETED | OUTPATIENT
Start: 2021-01-14 | End: 2021-01-14

## 2021-01-14 RX ORDER — BUPIVACAINE HYDROCHLORIDE 2.5 MG/ML
INJECTION, SOLUTION EPIDURAL; INFILTRATION; INTRACAUDAL
Status: COMPLETED | OUTPATIENT
Start: 2021-01-14 | End: 2021-01-14

## 2021-01-14 RX ORDER — CEFAZOLIN SODIUM 2 G/50ML
2 SOLUTION INTRAVENOUS ONCE
Status: COMPLETED | OUTPATIENT
Start: 2021-01-14 | End: 2021-01-14

## 2021-01-14 RX ORDER — FENTANYL CITRATE 50 UG/ML
INJECTION, SOLUTION INTRAMUSCULAR; INTRAVENOUS AS NEEDED
Status: DISCONTINUED | OUTPATIENT
Start: 2021-01-14 | End: 2021-01-14 | Stop reason: SURG

## 2021-01-14 RX ORDER — SODIUM CHLORIDE 9 MG/ML
40 INJECTION, SOLUTION INTRAVENOUS AS NEEDED
Status: DISCONTINUED | OUTPATIENT
Start: 2021-01-14 | End: 2021-01-14 | Stop reason: HOSPADM

## 2021-01-14 RX ORDER — TRAMADOL HYDROCHLORIDE 50 MG/1
50 TABLET ORAL EVERY 6 HOURS PRN
Qty: 20 TABLET | Refills: 0 | Status: SHIPPED | OUTPATIENT
Start: 2021-01-14 | End: 2021-08-24

## 2021-01-14 RX ORDER — BUPIVACAINE HYDROCHLORIDE AND EPINEPHRINE 5; 5 MG/ML; UG/ML
INJECTION, SOLUTION EPIDURAL; INTRACAUDAL; PERINEURAL AS NEEDED
Status: DISCONTINUED | OUTPATIENT
Start: 2021-01-14 | End: 2021-01-14 | Stop reason: HOSPADM

## 2021-01-14 RX ORDER — MIDAZOLAM HYDROCHLORIDE 2 MG/2ML
1 INJECTION, SOLUTION INTRAMUSCULAR; INTRAVENOUS
Status: DISCONTINUED | OUTPATIENT
Start: 2021-01-14 | End: 2021-01-14 | Stop reason: HOSPADM

## 2021-01-14 RX ADMIN — DEXMEDETOMIDINE HYDROCHLORIDE 30 MCG: 100 INJECTION, SOLUTION, CONCENTRATE INTRAVENOUS at 10:48

## 2021-01-14 RX ADMIN — BUPIVACAINE HYDROCHLORIDE 30 ML: 2.5 INJECTION, SOLUTION EPIDURAL; INFILTRATION; INTRACAUDAL; PERINEURAL at 10:48

## 2021-01-14 RX ADMIN — SCOPALAMINE 1 PATCH: 1 PATCH, EXTENDED RELEASE TRANSDERMAL at 10:13

## 2021-01-14 RX ADMIN — SODIUM CHLORIDE, POTASSIUM CHLORIDE, SODIUM LACTATE AND CALCIUM CHLORIDE 9 ML/HR: 600; 310; 30; 20 INJECTION, SOLUTION INTRAVENOUS at 10:12

## 2021-01-14 RX ADMIN — ACETAMINOPHEN 500 MG: 500 TABLET ORAL at 10:13

## 2021-01-14 RX ADMIN — SODIUM BICARBONATE 0.12 MEQ: 0.2 INJECTION, SOLUTION INTRAVENOUS at 08:46

## 2021-01-14 RX ADMIN — MIDAZOLAM HYDROCHLORIDE 1 MG: 1 INJECTION, SOLUTION INTRAMUSCULAR; INTRAVENOUS at 10:12

## 2021-01-14 RX ADMIN — LIDOCAINE HYDROCHLORIDE 60 MG: 20 INJECTION, SOLUTION INFILTRATION; PERINEURAL at 10:33

## 2021-01-14 RX ADMIN — ONDANSETRON 4 MG: 2 INJECTION, SOLUTION INTRAMUSCULAR; INTRAVENOUS at 13:24

## 2021-01-14 RX ADMIN — CEFAZOLIN SODIUM 2 G: 2 SOLUTION INTRAVENOUS at 10:33

## 2021-01-14 RX ADMIN — PROPOFOL 100 MG: 10 INJECTION, EMULSION INTRAVENOUS at 10:48

## 2021-01-14 RX ADMIN — DIPHENHYDRAMINE HYDROCHLORIDE 12.5 MG: 50 INJECTION, SOLUTION INTRAMUSCULAR; INTRAVENOUS at 10:12

## 2021-01-14 RX ADMIN — KETAMINE HYDROCHLORIDE 10 MG: 100 INJECTION INTRAMUSCULAR; INTRAVENOUS at 11:04

## 2021-01-14 RX ADMIN — PROPOFOL 150 MCG/KG/MIN: 10 INJECTION, EMULSION INTRAVENOUS at 10:35

## 2021-01-14 RX ADMIN — FENTANYL CITRATE 25 MCG: 50 INJECTION, SOLUTION INTRAMUSCULAR; INTRAVENOUS at 11:02

## 2021-01-14 RX ADMIN — ONDANSETRON 4 MG: 2 INJECTION, SOLUTION INTRAMUSCULAR; INTRAVENOUS at 10:12

## 2021-01-14 RX ADMIN — LIDOCAINE HYDROCHLORIDE 5 ML: 10 INJECTION, SOLUTION INFILTRATION; PERINEURAL at 08:45

## 2021-01-14 RX ADMIN — PROPOFOL 100 MG: 10 INJECTION, EMULSION INTRAVENOUS at 10:33

## 2021-01-14 RX ADMIN — KETAMINE HYDROCHLORIDE 20 MG: 100 INJECTION INTRAMUSCULAR; INTRAVENOUS at 10:34

## 2021-01-14 NOTE — ANESTHESIA PROCEDURE NOTES
Airway  Urgency: elective    Date/Time: 1/14/2021 10:34 AM  Airway not difficult    General Information and Staff    Patient location during procedure: OR  CRNA: Ilene Bocanegra CRNA    Indications and Patient Condition  Indications for airway management: airway protection    Preoxygenated: yes  MILS maintained throughout  Mask difficulty assessment: 0 - not attempted    Final Airway Details  Final airway type: supraglottic airway      Successful airway: unique  Size 4    Number of attempts at approach: 1  Assessment: lips, teeth, and gum same as pre-op and atraumatic intubation

## 2021-01-14 NOTE — ANESTHESIA POSTPROCEDURE EVALUATION
Patient: Khushboo Freeman    Procedure Summary     Date: 01/14/21 Room / Location:  [facility] 3 /  [facility]    Anesthesia Start: 1031 Anesthesia Stop: 1142    Procedure: Excision of mass of the right breast with preop needle localization (Right Breast) Diagnosis:       Breast mass, right      (Breast mass, right [N63.10])    Surgeon: Keturah Hendrickson DO Provider: Ilene Bocanegra CRNA    Anesthesia Type: general, general with block ASA Status: 2          Anesthesia Type: general, general with block    Vitals  Vitals Value Taken Time   /64 01/14/21 1255   Temp 97.7 °F (36.5 °C) 01/14/21 1141   Pulse 76 01/14/21 1255   Resp 14 01/14/21 1255   SpO2 97 % 01/14/21 1255           Post Anesthesia Care and Evaluation    Patient location during evaluation: PHASE II  Patient participation: complete - patient participated  Level of consciousness: awake  Pain score: 0  Pain management: adequate  Airway patency: patent  Anesthetic complications: No anesthetic complications  PONV Status: none  Cardiovascular status: acceptable  Respiratory status: acceptable  Hydration status: acceptable

## 2021-01-14 NOTE — H&P
Khushboo Freeman 54 y.o. female who presents for a breast biopsy       Chief Complaint   Patient presents with   • HEMATOMA       RIGHT BREAST                  HPI   This very pleasant patient presents today with an abnormal stereotactic biopsy and a hematoma of the breast.  She gave birth to her first child at age 24.  She had three children and breast fed all of them.  She started menses at age 11 and had a surgical menopause 11 years ago.  She has never had a breast surgery or mass in the past.  She does not smoke or use tobacco products.  She has no family history of breast cancer but her father's side of the family has cancer.  She has no fevers or chills.  Her only complaint is a hematoma from the biopsy.        Review of Systems   All other systems reviewed and are negative.                 Current Outpatient Medications:   •  amoxicillin-clavulanate (AUGMENTIN) 875-125 MG per tablet, Take 1 tablet by mouth 2 (Two) Times a Day., Disp: 14 tablet, Rfl: 0  •  cetirizine (zyrTEC) 10 MG tablet, Take 1 tablet by mouth Daily., Disp: 30 tablet, Rfl: 2                Allergies   Allergen Reactions   • Oxycodone-Acetaminophen Hives and Itching   • Prednisone Itching                    Past Medical History:   Diagnosis Date   • Anemia     • Fatty liver     • Fecal incontinence     • History of transfusion     • Kidney infection 2013     Hospitalized   • Uterine fibroid                       Past Surgical History:   Procedure Laterality Date   • CHOLECYSTECTOMY       • COLONOSCOPY   2016     normal per pt   • COLONOSCOPY N/A 3/1/2017     [facility]   • HYSTERECTOMY   07/2009   • RECTAL SURGERY   07/2016     Had a tear of rectum.  Dr. Lee               Social History           Tobacco Use   • Smoking status: Never Smoker   • Smokeless tobacco: Never Used   Substance Use Topics   • Alcohol use: No   • Drug use: No                    Immunization History   Administered Date(s) Administered   • Flulaval/Fluarix Quad  "09/15/2020               Physical Exam  Vitals signs and nursing note reviewed.   Constitutional:       Appearance: Normal appearance.   HENT:      Head: Normocephalic and atraumatic.   Cardiovascular:      Rate and Rhythm: Normal rate and regular rhythm.   Pulmonary:      Effort: Pulmonary effort is normal.      Breath sounds: Normal breath sounds.   Abdominal:      General: Bowel sounds are normal.      Palpations: Abdomen is soft.   Musculoskeletal:         General: No swelling or tenderness.   Skin:     General: Skin is warm and dry.   Neurological:      General: No focal deficit present.      Mental Status: She is alert and oriented to person, place, and time.   Psychiatric:         Mood and Affect: Mood normal.         Behavior: Behavior normal.            Debilities/Disabilities Identified: None     Emotional Behavior: Appropriate        /72   Pulse 72   Resp 16   Ht 152.4 cm (60\")   Wt 62.6 kg (138 lb)   LMP  (LMP Unknown)   BMI 26.95 kg/m²            Khushboo was seen today for hematoma.     Diagnoses and all orders for this visit:     Intraductal papilloma of breast, right     We discussed alternating ice with heat for the hematoma.  We discussed that this lesion is most likely benign but it does slightly increase her risk of breast cancer.  We discussed performing an excision of breast mass with preoperative localization.  We discussed the benefits and risks which she appeared to understand and is willing to proceed.          "

## 2021-01-14 NOTE — OP NOTE
GENERAL SURGERY :  Emeka  Khushboo Freeman  1965    Procedure Date: 01/14/21    Pre-op Diagnosis: Intraductal papilloma of the right breast    Post-op Diagnosis: Intraductal papilloma the right breast    Procedure: Excision of intraductal papilloma the right breast with preop needle localization    Surgeon: Emeka    Assistant: None    Estimated Blood Loss:  5 ml    Complications: None    Specimen: 3 mm intraductal papilloma of the right breast with clip and needle and surrounding tissue    Findings: The x-rays showed that we remove the 3 mm of abnormal tissue as well as the clip and needle and some surrounding tissue.  The entire specimen measured approximately 3 cm.    Clinical Note: Khushboo presented with a concerning breast nodule.  We discussed the benefits of excising this.  Benefits risks not limited to but including: Bleeding, infection, hematoma or seroma formation, anesthesia complications, having return for further surgery if cancer was found.  Khushboo appeared to understand the benefits risks and signed informed consent.    Procedure: Khushboo was taken to the operative suite at [facility].  She was placed under a general anesthetic with LMA intubation.  She was then given a pec block under ultrasound guidance by anesthesia.  She was then prepped and draped in a sterile fashion.  After appropriate timeout was performed incision was made at the inferior aspect of the areola.  Using scissors we then were able to dissect the tissue until reaching the wire.  We then excised the tissue surrounding the wire and removed it.  We sent that for x-ray.  X-ray showed that we got the clip and the abnormal appearing tissue.  We then injected more local.  Meticulous hemostasis was maintained throughout the procedure.  There was no bleeding noted.  The incision was closed in a layered fashion using 2-0 Monocryl and 5-0 PDS suture.  Steri-Strips and sterile dressings were then  applied.  Khushboo then had her anesthesia reversed, her LMA removed, and she was taken to the recovery area in stable postoperative condition having tolerated her procedure well.        Keturah Hendrickson DO  11:34 EST

## 2021-01-14 NOTE — ANESTHESIA PROCEDURE NOTES
Peripheral Block      Patient reassessed immediately prior to procedure    Patient location during procedure: OR  Start time: 1/14/2021 10:44 AM  Stop time: 1/14/2021 10:48 AM  Reason for block: at surgeon's request and post-op pain management  Performed by  CRNA: Ilene Bocanegra CRNA  Preanesthetic Checklist  Completed: patient identified, site marked, surgical consent, pre-op evaluation, timeout performed, IV checked, risks and benefits discussed and monitors and equipment checked  Prep:  Pt Position: supine  Sterile barriers:cap, partial drape, gloves, mask and washed/disinfected hands  Prep: ChloraPrep  Patient monitoring: blood pressure monitoring, continuous pulse oximetry and EKG  Procedure  Sedation:yes  Performed under: MAC  Guidance:ultrasound guided  ULTRASOUND INTERPRETATION. Using ultrasound guidance a 21 G gauge needle was placed in close proximity to the nerve, at which point, under ultrasound guidance anesthetic was injected in the area of the nerve and spread of the anesthesia was seen on ultrasound in close proximity thereto.  There were no abnormalities seen on ultrasound; a digital image was taken; and the patient tolerated the procedure with no complications. Images:still images obtained, printed/placed on chart    Laterality:right  Block Type:PECS I and PECS II  Injection Technique:single-shot  Needle Type:echogenic  Needle Gauge:21 G  Resistance on Injection: none    Medications Used: bupivacaine PF (MARCAINE) injection 0.25%, 30 mL  Med admintered at 1/14/2021 10:48 AM      Post Assessment  Injection Assessment: negative aspiration for heme, no paresthesia on injection and incremental injection  Patient Tolerance:comfortable throughout block  Complications:no  Additional Notes  Precedex 30mcg added to block volume

## 2021-01-15 ENCOUNTER — TELEPHONE (OUTPATIENT)
Dept: FAMILY MEDICINE CLINIC | Facility: CLINIC | Age: 56
End: 2021-01-15

## 2021-01-15 ENCOUNTER — TELEPHONE (OUTPATIENT)
Dept: SURGERY | Facility: CLINIC | Age: 56
End: 2021-01-15

## 2021-01-15 LAB
LAB AP CASE REPORT: NORMAL
PATH REPORT.FINAL DX SPEC: NORMAL
PATH REPORT.GROSS SPEC: NORMAL

## 2021-01-15 NOTE — TELEPHONE ENCOUNTER
Pt called and reports she is itching all over and feels shaky.  Pt reports she has been taking Ultram since yesterday and has not eaten since yesterday. Contacted office of Dr Hendrickson and follow the instructions, but has not got any better    Please Advise.    [phone]

## 2021-01-15 NOTE — TELEPHONE ENCOUNTER
Pt called and reports she is itching all over and feels shaky.  Pt reports she has been taking Ultram since yesterday and has not eaten since yesterday. Spoke with Dr. Hendrickson and the following orders rec'd and given to pt:  1) stop Ultram  2) Use Benadryl per package directions  3) Use Tylenol/Ibuprfen for pain  4) Use ice on incision  5) Eat breakfast    If s/s persist, change, or increase go to ER for eval.  Pt agreeable.

## 2021-01-15 NOTE — TELEPHONE ENCOUNTER
After speaking with provider I called pt and informed her that she will have to call Dr. White office for a different pain medication

## 2021-01-15 NOTE — TELEPHONE ENCOUNTER
Pt is itching allover, has bumps on her tongue, cant stand up, gets dizzy and starts to tumble.  Pt had a tumor removed from her breast yesterday with dr Hendrickson.  Pt was prescribed tramadol and is having an allergic reaction.  Pt called [facility] office and they advised her to stop the tramadol, and advised her to take tyelenol for pain and benadryl for the bumps her tongue.  Pt is wanting something other than the tramadol for pain.  Please advise

## 2021-01-29 ENCOUNTER — OFFICE VISIT (OUTPATIENT)
Dept: SURGERY | Facility: CLINIC | Age: 56
End: 2021-01-29

## 2021-01-29 DIAGNOSIS — Z98.890 STATUS POST BREAST BIOPSY: Primary | ICD-10-CM

## 2021-01-29 PROBLEM — N63.10 BREAST MASS, RIGHT: Status: RESOLVED | Noted: 2020-10-28 | Resolved: 2021-01-29

## 2021-01-29 PROCEDURE — 99024 POSTOP FOLLOW-UP VISIT: CPT | Performed by: SURGERY

## 2021-01-29 NOTE — PROGRESS NOTES
Khushboo Freeman 55 y.o. female presents for PO FU.  Pt states she feels well.  Pt is ready to return to work if doctor approves.      HPI   Above noted and agree.  Khushboo is here following her breast biopsy.  The pathology was negative for cancer.  She has no complaints.      Review of Systems        Past Medical History:   Diagnosis Date   • Anemia    • Fatty liver    • Fecal incontinence    • Gastritis    • Hematoma of breast     right, sched excision   • History of transfusion    • Kidney infection 2013    Hospitalized   • Low blood pressure    • Metatarsal fracture     left foot, sched surgery 11/2020   • PONV (postoperative nausea and vomiting)    • Seasonal allergies     states has non prod cough and loses sense of taste with, not new    • Uterine fibroid            Past Surgical History:   Procedure Laterality Date   • BREAST LUMPECTOMY Right 1/14/2021    Procedure: Excision of mass of the right breast with preop needle localization;  Surgeon: Keturah Hendrickson DO;  Location: [facility];  Service: General;  Laterality: Right;  BREAST LUMPECTOMY   • CHOLECYSTECTOMY     • COLONOSCOPY  2016    normal per pt   • COLONOSCOPY N/A 3/1/2017    [facility]   • FOOT SURGERY Left 11/18/2020   • HYSTERECTOMY  07/2009   • RECTAL SURGERY  07/2016    Had a tear of rectum.  Dr. Lee           Physical Exam    Her wound is healing well without signs of infection    LMP  (LMP Unknown)         Diagnoses and all orders for this visit:    1. Status post breast biopsy (Primary)    Khushboo may return to work without limitations.      Thank you for allowing me to participate in the care of this interesting patient.

## 2021-03-08 ENCOUNTER — OFFICE VISIT (OUTPATIENT)
Dept: FAMILY MEDICINE CLINIC | Facility: CLINIC | Age: 56
End: 2021-03-08

## 2021-03-08 VITALS
HEART RATE: 86 BPM | SYSTOLIC BLOOD PRESSURE: 122 MMHG | HEIGHT: 59 IN | DIASTOLIC BLOOD PRESSURE: 72 MMHG | BODY MASS INDEX: 26.48 KG/M2 | OXYGEN SATURATION: 97 % | TEMPERATURE: 98.1 F

## 2021-03-08 DIAGNOSIS — R79.89 ELEVATED LIVER FUNCTION TESTS: ICD-10-CM

## 2021-03-08 DIAGNOSIS — E78.00 ELEVATED CHOLESTEROL: ICD-10-CM

## 2021-03-08 DIAGNOSIS — R53.83 FATIGUE, UNSPECIFIED TYPE: ICD-10-CM

## 2021-03-08 DIAGNOSIS — Z00.00 ENCOUNTER FOR WELLNESS EXAMINATION IN ADULT: Primary | ICD-10-CM

## 2021-03-08 PROCEDURE — 99396 PREV VISIT EST AGE 40-64: CPT | Performed by: FAMILY MEDICINE
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
## 2021-03-09 ENCOUNTER — LAB (OUTPATIENT)
Dept: FAMILY MEDICINE CLINIC | Facility: CLINIC | Age: 56
End: 2021-03-09

## 2021-03-09 VITALS — TEMPERATURE: 96 F

## 2021-03-15 ENCOUNTER — TELEPHONE (OUTPATIENT)
Dept: FAMILY MEDICINE CLINIC | Facility: CLINIC | Age: 56
End: 2021-03-15

## 2021-03-15 DIAGNOSIS — K76.0 FATTY LIVER: ICD-10-CM

## 2021-03-15 DIAGNOSIS — K59.1 FUNCTIONAL DIARRHEA: Primary | ICD-10-CM

## 2021-03-16 NOTE — TELEPHONE ENCOUNTER
Can you please find out why the patient is wanting to see gastroenterology?  I do not recall talking with her about it at a previous visit

## 2021-03-17 NOTE — TELEPHONE ENCOUNTER
Pt stated she is lactose intolerant, and having issues with her stomach.  Stated she has to see her every 2 years.  Stated she has been having problems with bloating and diarrhea

## 2021-08-24 ENCOUNTER — TELEPHONE (OUTPATIENT)
Dept: FAMILY MEDICINE CLINIC | Facility: CLINIC | Age: 56
End: 2021-08-24

## 2021-08-24 ENCOUNTER — OFFICE VISIT (OUTPATIENT)
Dept: FAMILY MEDICINE CLINIC | Facility: CLINIC | Age: 56
End: 2021-08-24

## 2021-08-24 VITALS
TEMPERATURE: 97.3 F | BODY MASS INDEX: 26.77 KG/M2 | HEIGHT: 59 IN | HEART RATE: 72 BPM | WEIGHT: 132.8 LBS | SYSTOLIC BLOOD PRESSURE: 124 MMHG | DIASTOLIC BLOOD PRESSURE: 76 MMHG | OXYGEN SATURATION: 99 %

## 2021-08-24 DIAGNOSIS — R10.31 RIGHT LOWER QUADRANT ABDOMINAL PAIN: Primary | ICD-10-CM

## 2021-08-24 DIAGNOSIS — R11.0 NAUSEA: ICD-10-CM

## 2021-08-24 LAB
BILIRUB BLD-MCNC: ABNORMAL MG/DL
CLARITY, POC: CLEAR
COLOR UR: YELLOW
GLUCOSE UR STRIP-MCNC: NEGATIVE MG/DL
KETONES UR QL: NEGATIVE
LEUKOCYTE EST, POC: NEGATIVE
NITRITE UR-MCNC: NEGATIVE MG/ML
PH UR: 6 [PH] (ref 5–8)
PROT UR STRIP-MCNC: ABNORMAL MG/DL
RBC # UR STRIP: NEGATIVE /UL
SP GR UR: 1.03 (ref 1–1.03)
UROBILINOGEN UR QL: NORMAL

## 2021-08-24 PROCEDURE — 99214 OFFICE O/P EST MOD 30 MIN: CPT | Performed by: FAMILY MEDICINE

## 2021-08-24 RX ORDER — PROMETHAZINE HYDROCHLORIDE 12.5 MG/1
12.5 TABLET ORAL EVERY 8 HOURS PRN
Qty: 30 TABLET | Refills: 0 | Status: SHIPPED | OUTPATIENT
Start: 2021-08-24 | End: 2022-06-02

## 2021-08-24 NOTE — TELEPHONE ENCOUNTER
Please let the patient know that I have sent in a prescription for Phenergan to the local pharmacy for her to take for nausea which also may help with the lower abdominal burning.

## 2021-08-24 NOTE — PROGRESS NOTES
"Chief Complaint  Abdominal Pain (fever around belly) and Diarrhea    Subjective          Khushboo Freeman presents to [facility]  Patient is here today with a new problem.  She started noticing diarrhea about 4 days ago.  She has also had intermittent lower right-sided abdominal burning.  Her last stool was this morning and it was loose but not diarrhea.  Patient is nauseated but has not had any vomiting.  Denies fever or chills.  Patient denies any black or bloody stools.  The patient states that her symptoms have improved from when she originally started with diarrhea.  However, they are still present intermittently.  Patient denies any known exposure to illness.  The patient did receive both doses of the COVID-19 vaccine a few months ago.  The patient does work with students at the school.  Patient does have a history of prior colonoscopy about 5 years or more ago.        Objective   Vital Signs:   /76   Pulse 72   Temp 97.3 °F (36.3 °C)   Ht 149.9 cm (59\")   Wt 60.2 kg (132 lb 12.8 oz)   SpO2 99%   BMI 26.82 kg/m²     Physical Exam  Vitals and nursing note reviewed.   Constitutional:       Appearance: Normal appearance. She is well-developed and normal weight.   HENT:      Head: Normocephalic and atraumatic.   Eyes:      General: No scleral icterus.     Conjunctiva/sclera: Conjunctivae normal.   Cardiovascular:      Rate and Rhythm: Normal rate and regular rhythm.      Heart sounds: Normal heart sounds.   Pulmonary:      Effort: Pulmonary effort is normal.      Breath sounds: Normal breath sounds.   Abdominal:      General: Bowel sounds are normal. There is no distension.      Palpations: Abdomen is soft. There is no mass.      Tenderness: There is no right CVA tenderness, left CVA tenderness, guarding or rebound.      Hernia: No hernia is present.   Musculoskeletal:         General: Normal range of motion.      Cervical back: Normal range of motion and neck " supple.      Right lower leg: No edema.      Left lower leg: No edema.   Skin:     General: Skin is warm and dry.      Capillary Refill: Capillary refill takes less than 2 seconds.      Findings: No rash.   Neurological:      Mental Status: She is alert and oriented to person, place, and time.   Psychiatric:         Mood and Affect: Mood normal.         Behavior: Behavior normal.         Thought Content: Thought content normal.         Judgment: Judgment normal.        Result Review :   The following data was reviewed by: Nicolle Galvez DO on 08/24/2021:  Common labs    Common Labsle 10/26/20 1/11/21 3/9/21 3/9/21      0813 0813   Glucose   100 (A)    BUN   12    Creatinine   0.60    eGFR Non  Am   103    eGFR African Am   125    Sodium   141    Potassium   3.9    Chloride   105    Calcium   8.9    Total Protein   6.6    Albumin   3.90    Total Bilirubin   0.6    Alkaline Phosphatase   121 (A)    AST (SGOT)   40 (A)    ALT (SGPT)   26    WBC 3.36 (A) 4.51     Hemoglobin 14.2 14.0     Hematocrit 44.5 43.3     Platelets 195 180     Total Cholesterol    216 (A)   Triglycerides    161 (A)   HDL Cholesterol    54   LDL Cholesterol     133 (A)   (A) Abnormal value       Comments are available for some flowsheets but are not being displayed.                    Admission (Discharged) with Jammie Collado MD (03/01/2017)       Assessment and Plan    Diagnoses and all orders for this visit:    1. Right lower quadrant abdominal pain (Primary)  -     POC Urinalysis Dipstick, Automated  -     COVID-19,[facility] ROUTINE, NP/OP SWAB IN TRANSPORT MEDIA OR ESWAB 72 HR TAT - Swab, Oropharynx; Future  -     COVID-19,[facility] ROUTINE, NP/OP SWAB IN TRANSPORT MEDIA OR ESWAB 72 HR TAT - Swab, Oropharynx  -     Comprehensive Metabolic Panel  -     CBC & Differential  -     Amylase  -     Lipase    2. Nausea  -     Comprehensive Metabolic Panel  -     CBC & Differential  -     Amylase  -     Lipase  -     promethazine (PHENERGAN)  12.5 MG tablet; Take 1 tablet by mouth Every 8 (Eight) Hours As Needed for Nausea or Vomiting.  Dispense: 30 tablet; Refill: 0      Patient was seen today for new problem of abdominal pain and nausea.  Urinalysis shows some evidence of dehydration but otherwise does not show signs of infection.  I will send blood work to evaluate solid organs of the abdomen.  Patient was advised to avoid dairy and eat a brat diet until her symptoms improve.  If her symptoms persist even with normal labs the patient was advised to follow-up.      Follow Up   Return if symptoms worsen or fail to improve.  Patient was given instructions and counseling regarding her condition or for health maintenance advice. Please see specific information pulled into the AVS if appropriate.

## 2021-08-24 NOTE — TELEPHONE ENCOUNTER
Caller: Khushboo Freeman    Relationship to patient: Self    Best call back number: [phone]    Patient is needing: PATIENT WANTS TO KNOW IF THERE IS ANYTHING SHE CAN DRINK TO ALLEVIATE LOWER ABDOMINAL PAIN.

## 2021-08-25 LAB
ALBUMIN SERPL-MCNC: 4.3 G/DL (ref 3.8–4.9)
ALBUMIN/GLOB SERPL: 1.5 {RATIO} (ref 1.2–2.2)
ALP SERPL-CCNC: 115 IU/L (ref 48–121)
ALT SERPL-CCNC: 31 IU/L (ref 0–32)
AMYLASE SERPL-CCNC: 59 U/L (ref 31–110)
AST SERPL-CCNC: 39 IU/L (ref 0–40)
BASOPHILS # BLD AUTO: 0 X10E3/UL (ref 0–0.2)
BASOPHILS NFR BLD AUTO: 0 %
BILIRUB SERPL-MCNC: 0.5 MG/DL (ref 0–1.2)
BUN SERPL-MCNC: 11 MG/DL (ref 6–24)
BUN/CREAT SERPL: 18 (ref 9–23)
CALCIUM SERPL-MCNC: 9.3 MG/DL (ref 8.7–10.2)
CHLORIDE SERPL-SCNC: 105 MMOL/L (ref 96–106)
CO2 SERPL-SCNC: 23 MMOL/L (ref 20–29)
CREAT SERPL-MCNC: 0.6 MG/DL (ref 0.57–1)
EOSINOPHIL # BLD AUTO: 0.1 X10E3/UL (ref 0–0.4)
EOSINOPHIL NFR BLD AUTO: 2 %
ERYTHROCYTE [DISTWIDTH] IN BLOOD BY AUTOMATED COUNT: 12.9 % (ref 11.7–15.4)
GLOBULIN SER CALC-MCNC: 2.8 G/DL (ref 1.5–4.5)
GLUCOSE SERPL-MCNC: 91 MG/DL (ref 65–99)
HCT VFR BLD AUTO: 44.3 % (ref 34–46.6)
HGB BLD-MCNC: 14.7 G/DL (ref 11.1–15.9)
IMM GRANULOCYTES # BLD AUTO: 0 X10E3/UL (ref 0–0.1)
IMM GRANULOCYTES NFR BLD AUTO: 0 %
LABCORP SARS-COV-2, NAA 2 DAY TAT: NORMAL
LIPASE SERPL-CCNC: 14 U/L (ref 14–72)
LYMPHOCYTES # BLD AUTO: 1.4 X10E3/UL (ref 0.7–3.1)
LYMPHOCYTES NFR BLD AUTO: 30 %
MCH RBC QN AUTO: 31.5 PG (ref 26.6–33)
MCHC RBC AUTO-ENTMCNC: 33.2 G/DL (ref 31.5–35.7)
MCV RBC AUTO: 95 FL (ref 79–97)
MONOCYTES # BLD AUTO: 0.3 X10E3/UL (ref 0.1–0.9)
MONOCYTES NFR BLD AUTO: 5 %
NEUTROPHILS # BLD AUTO: 3 X10E3/UL (ref 1.4–7)
NEUTROPHILS NFR BLD AUTO: 63 %
PLATELET # BLD AUTO: 213 X10E3/UL (ref 150–450)
POTASSIUM SERPL-SCNC: 4 MMOL/L (ref 3.5–5.2)
PROT SERPL-MCNC: 7.1 G/DL (ref 6–8.5)
RBC # BLD AUTO: 4.67 X10E6/UL (ref 3.77–5.28)
SARS-COV-2 RNA RESP QL NAA+PROBE: NOT DETECTED
SODIUM SERPL-SCNC: 141 MMOL/L (ref 134–144)
WBC # BLD AUTO: 4.8 X10E3/UL (ref 3.4–10.8)

## 2021-08-26 ENCOUNTER — TELEPHONE (OUTPATIENT)
Dept: FAMILY MEDICINE CLINIC | Facility: CLINIC | Age: 56
End: 2021-08-26

## 2021-08-26 NOTE — TELEPHONE ENCOUNTER
Called pt let her know dr marion wants her to try this med and if it doesn't work she can call back

## 2021-08-26 NOTE — TELEPHONE ENCOUNTER
PATIENT IS NEEDING A CALL BACK REGARDING HER MEDICATION SHE WAS GIVEN YESTERDAY. IT IS FOR NAUSEA BUT SHE IS HAVING STOMACH PAIN .     PLEASE ADVISE  [phone]

## 2022-02-07 ENCOUNTER — OFFICE VISIT (OUTPATIENT)
Dept: FAMILY MEDICINE CLINIC | Facility: CLINIC | Age: 57
End: 2022-02-07

## 2022-02-07 VITALS
WEIGHT: 138.6 LBS | SYSTOLIC BLOOD PRESSURE: 100 MMHG | HEART RATE: 69 BPM | TEMPERATURE: 97.5 F | DIASTOLIC BLOOD PRESSURE: 72 MMHG | HEIGHT: 59 IN | BODY MASS INDEX: 27.94 KG/M2 | OXYGEN SATURATION: 99 %

## 2022-02-07 DIAGNOSIS — R19.7 DIARRHEA, UNSPECIFIED TYPE: ICD-10-CM

## 2022-02-07 DIAGNOSIS — R07.81 RIB PAIN ON LEFT SIDE: Primary | ICD-10-CM

## 2022-02-07 PROCEDURE — 99214 OFFICE O/P EST MOD 30 MIN: CPT | Performed by: FAMILY MEDICINE

## 2022-02-07 RX ORDER — CYCLOBENZAPRINE HCL 5 MG
10 TABLET ORAL NIGHTLY PRN
Qty: 20 TABLET | Refills: 0 | Status: SHIPPED | OUTPATIENT
Start: 2022-02-07 | End: 2022-05-16 | Stop reason: SDUPTHER

## 2022-02-07 RX ORDER — MELOXICAM 15 MG/1
15 TABLET ORAL DAILY
Qty: 20 TABLET | Refills: 0 | Status: SHIPPED | OUTPATIENT
Start: 2022-02-07 | End: 2022-05-16 | Stop reason: SDUPTHER

## 2022-02-07 NOTE — PROGRESS NOTES
Chief Complaint  Pain (rib left side) and Diarrhea    Subjective          Khushboo Freeman presents to [facility]  History of Present Illness  The patient presents today stating that she has left-sided rib pain.    She states that she had lumpectomy surgery on her right breast about 1 year ago, and 6 months ago her right breast started becoming sensitive. She normally sleeps on her right side, and since the sensitivity started she has to put pillows under her head, and on her side. She has been having pain in the left side of her back over her ribs. The patient indicates that the pain is from her left axilla to midway down her left side, and states it is a constant pain at night, especially when she leans on the area. It is not affected by breathing. The pain is alleviated by moist heat such as placing a bottle of hot water on the area, or applying warm towels. She has not tried to apply ice. Movement of her arms does not aggravate her pain. The pain has been ongoing intermittently over the last 2 weeks, but has worsened in the last 3 days, and she has not been able to sleep for the last 3 nights due to the pain. She adds that during the day she does not have the pain. She has not taken ibuprofen or Tylenol. She states that it feels like her ribs are swollen. She will be seeing the breast surgeon, Dr. Hendrickson in 03/2022.    The patient has been experiencing diarrhea since Saturday, 02/05/2022. She has been preparing her own meals at home, and only drinks water, lemonade, or ginger ale due to gas. She denies pain in her belly. She denies hematochezia. She denies melena of her stools. She has had 7 episodes of diarrhea each day for the last 2 days. She started taking Centrum Silver on 02/04/2022, and it makes her feel hungry. Her diarrhea started the day after she took the Centrum Silver on 02/05/2022.    She states she has a foot callous, and she has been seeing podiatry, but not  "for this issue.    Objective   Vital Signs:   /72   Pulse 69   Temp 97.5 °F (36.4 °C)   Ht 149.9 cm (59\")   Wt 62.9 kg (138 lb 9.6 oz)   SpO2 99%   BMI 27.99 kg/m²     Physical Exam  Vitals and nursing note reviewed.   Constitutional:       Appearance: Normal appearance. She is well-developed.   HENT:      Head: Normocephalic and atraumatic.      Right Ear: External ear normal.      Left Ear: External ear normal.      Nose: Nose normal.   Eyes:      General: No scleral icterus.     Conjunctiva/sclera: Conjunctivae normal.   Cardiovascular:      Rate and Rhythm: Normal rate and regular rhythm.      Heart sounds: Normal heart sounds.   Pulmonary:      Effort: Pulmonary effort is normal.      Breath sounds: Normal breath sounds.   Musculoskeletal:         General: Tenderness (Tenderness to palpation over the ribs just underneath the left axilla) present.      Cervical back: Normal range of motion and neck supple.      Right lower leg: No edema.      Left lower leg: No edema.   Lymphadenopathy:      Cervical: No cervical adenopathy.   Skin:     General: Skin is warm and dry.      Findings: No rash.   Neurological:      Mental Status: She is alert and oriented to person, place, and time.   Psychiatric:         Mood and Affect: Mood normal.         Behavior: Behavior normal.         Thought Content: Thought content normal.         Judgment: Judgment normal.        Result Review :                 Assessment and Plan    Diagnoses and all orders for this visit:    1. Rib pain on left side (Primary)  -     meloxicam (Mobic) 15 MG tablet; Take 1 tablet by mouth Daily. For inflammation  Dispense: 20 tablet; Refill: 0  -     cyclobenzaprine (FLEXERIL) 5 MG tablet; Take 2 tablets by mouth At Night As Needed for Muscle Spasms.  Dispense: 20 tablet; Refill: 0    2. Diarrhea, unspecified type       1. Rib pain with inflamed muscle  - I will prescribe meloxicam for inflammation and pain.  - She will be prescribed a muscle " relaxer to take 1 to 2 at bedtime, and start by taking 1 at bedtime.  - She will discontinue applications of heat.  - Start applying ice on the area right before bed for 30 minutes.    2. Diarrhea  - Discontinue Centrum Silver due to symptoms of diarrhea.  - Keep well-hydrated with water, and avoid carbonated beverages.   - Avoid fatty foods.  - She should also avoid dairy until the diarrhea improves.  - Return to the clinic in 1 week if the diarrhea does not improve.    3. Right breast pain  - The pathology came back benign.  - Keep the follow-up appointment with Dr. Hendrickson, at which time she can discuss when she is due for a mammogram, and if she should still be having sensitivity from her surgery.      I spent 32 minutes caring for Khushboo on this date of service. This time includes time spent by me in the following activities:performing a medically appropriate examination and/or evaluation , counseling and educating the patient/family/caregiver, ordering medications, tests, or procedures and documenting information in the medical record  Follow Up   No follow-ups on file.  Patient was given instructions and counseling regarding her condition or for health maintenance advice. Please see specific information pulled into the AVS if appropriate.       Transcribed from ambient dictation for Nicolle Galvez DO by Kaetlyn Winkler.  02/07/22   15:45 EST    Patient verbalized consent to the visit recording.

## 2022-03-02 ENCOUNTER — OFFICE VISIT (OUTPATIENT)
Dept: FAMILY MEDICINE CLINIC | Facility: CLINIC | Age: 57
End: 2022-03-02

## 2022-03-02 VITALS
OXYGEN SATURATION: 98 % | WEIGHT: 137.8 LBS | SYSTOLIC BLOOD PRESSURE: 110 MMHG | HEART RATE: 78 BPM | TEMPERATURE: 97.7 F | HEIGHT: 59 IN | DIASTOLIC BLOOD PRESSURE: 64 MMHG | BODY MASS INDEX: 27.78 KG/M2

## 2022-03-02 DIAGNOSIS — J10.1 INFLUENZA A: Primary | ICD-10-CM

## 2022-03-02 LAB
EXPIRATION DATE: ABNORMAL
EXPIRATION DATE: NORMAL
FLUAV AG NPH QL: POSITIVE
FLUBV AG NPH QL: NEGATIVE
INTERNAL CONTROL: ABNORMAL
INTERNAL CONTROL: NORMAL
Lab: ABNORMAL
Lab: NORMAL
S PYO AG THROAT QL: NEGATIVE

## 2022-03-02 PROCEDURE — 87880 STREP A ASSAY W/OPTIC: CPT | Performed by: FAMILY MEDICINE

## 2022-03-02 PROCEDURE — 87804 INFLUENZA ASSAY W/OPTIC: CPT | Performed by: FAMILY MEDICINE

## 2022-03-02 PROCEDURE — 99214 OFFICE O/P EST MOD 30 MIN: CPT | Performed by: FAMILY MEDICINE

## 2022-03-02 RX ORDER — DEXTROMETHORPHAN HYDROBROMIDE AND PROMETHAZINE HYDROCHLORIDE 15; 6.25 MG/5ML; MG/5ML
5 SYRUP ORAL NIGHTLY PRN
Qty: 118 ML | Refills: 0 | Status: SHIPPED | OUTPATIENT
Start: 2022-03-02 | End: 2022-06-02

## 2022-03-02 NOTE — PROGRESS NOTES
"Chief Complaint  Chills, Sinusitis, Sore Throat, and Generalized Body Aches    Subjective          Khushboo Freeman presents to [facility]  Patient Is here today for a new problem.  She started noticing headache and sinus pressure in the forehead and chills since yesterday. No fevers, but she was having sweats.  She has been achy and fatigued too.   She also had some dental work last week on the left side the lower molar and she is still having some pain in the left side of her jaw on her left ear.  Patient denies any hearing issues.  She denies any sore throat.  She denies cough or shortness of breath.  The patient has had a flu vaccine this year and is fully immunized against COVID.  Patient does work in the school system.      Objective   Vital Signs:   /64   Pulse 78   Temp 97.7 °F (36.5 °C)   Ht 149.9 cm (59\")   Wt 62.5 kg (137 lb 12.8 oz)   SpO2 98%   BMI 27.83 kg/m²     Physical Exam  Vitals and nursing note reviewed.   Constitutional:       Appearance: Normal appearance. She is well-developed and normal weight.   HENT:      Head: Normocephalic and atraumatic.      Right Ear: External ear normal.      Left Ear: External ear normal.      Nose: Nose normal.   Eyes:      General: No scleral icterus.     Conjunctiva/sclera: Conjunctivae normal.   Cardiovascular:      Rate and Rhythm: Normal rate and regular rhythm.      Heart sounds: Normal heart sounds.   Pulmonary:      Effort: Pulmonary effort is normal.      Breath sounds: Normal breath sounds.   Musculoskeletal:      Cervical back: Normal range of motion and neck supple.      Right lower leg: No edema.      Left lower leg: No edema.   Lymphadenopathy:      Cervical: No cervical adenopathy.   Skin:     General: Skin is warm and dry.      Findings: No rash.   Neurological:      Mental Status: She is alert and oriented to person, place, and time.   Psychiatric:         Mood and Affect: Mood normal.         " Behavior: Behavior normal.         Thought Content: Thought content normal.         Judgment: Judgment normal.        Result Review :   The following data was reviewed by: Nicolle Galvez DO on 03/02/2022:  Common labs    Common Labsle 3/9/21 3/9/21 8/24/21 8/24/21    0813 0813 1253 1253   Glucose 100 (A)  91    BUN 12  11    Creatinine 0.60  0.60    eGFR Non  Am 103  102    eGFR African Am 125  117    Sodium 141  141    Potassium 3.9  4.0    Chloride 105  105    Calcium 8.9  9.3    Total Protein 6.6  7.1    Albumin 3.90  4.3    Total Bilirubin 0.6  0.5    Alkaline Phosphatase 121 (A)  115    AST (SGOT) 40 (A)  39    ALT (SGPT) 26  31    WBC    4.8   Hemoglobin    14.7   Hematocrit    44.3   Platelets    213   Total Cholesterol  216 (A)     Triglycerides  161 (A)     HDL Cholesterol  54     LDL Cholesterol   133 (A)     (A) Abnormal value       Comments are available for some flowsheets but are not being displayed.             Influenza A+  Influenza B -              Assessment and Plan    Diagnoses and all orders for this visit:    1. Influenza A (Primary)  -     COVID-19,[facility] ROUTINE, NP/OP SWAB IN TRANSPORT MEDIA OR ESWAB 72 HR TAT - Swab, Nasopharynx  -     POC Influenza A / B  -     POCT rapid strep A  -     promethazine-dextromethorphan (PROMETHAZINE-DM) 6.25-15 MG/5ML syrup; Take 5 mL by mouth At Night As Needed for Cough.  Dispense: 118 mL; Refill: 0    Patient is here today for influenza A.  Since she has had a flu vaccine and no underlying medical issues we discussed Tamiflu and felt that it would not be beneficial in her case.  I advised her to drink plenty of fluids and get plenty of rest.  She should monitor her symptoms closely and follow-up for worsening symptoms.  Patient should wear a mask to help decrease the spread of infection.  Covid test is also pending.    Follow Up   Return if symptoms worsen or fail to improve.  Patient was given instructions and counseling regarding her condition  or for health maintenance advice. Please see specific information pulled into the AVS if appropriate.

## 2022-03-03 LAB
LABCORP SARS-COV-2, NAA 2 DAY TAT: NORMAL
SARS-COV-2 RNA RESP QL NAA+PROBE: NOT DETECTED

## 2022-05-16 ENCOUNTER — OFFICE VISIT (OUTPATIENT)
Dept: FAMILY MEDICINE CLINIC | Facility: CLINIC | Age: 57
End: 2022-05-16

## 2022-05-16 VITALS
HEIGHT: 59 IN | HEART RATE: 78 BPM | BODY MASS INDEX: 28.06 KG/M2 | SYSTOLIC BLOOD PRESSURE: 108 MMHG | TEMPERATURE: 97.3 F | DIASTOLIC BLOOD PRESSURE: 68 MMHG | WEIGHT: 139.2 LBS | OXYGEN SATURATION: 99 %

## 2022-05-16 DIAGNOSIS — M25.562 ACUTE PAIN OF BOTH KNEES: ICD-10-CM

## 2022-05-16 DIAGNOSIS — T14.8XXA ABRASION OF SKIN: ICD-10-CM

## 2022-05-16 DIAGNOSIS — M25.561 ACUTE PAIN OF BOTH KNEES: ICD-10-CM
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
## 2022-05-16 PROCEDURE — 99214 OFFICE O/P EST MOD 30 MIN: CPT | Performed by: NURSE PRACTITIONER

## 2022-05-16 RX ORDER — MELOXICAM 15 MG/1
15 TABLET ORAL DAILY
Qty: 20 TABLET | Refills: 0 | Status: SHIPPED | OUTPATIENT
Start: 2022-05-16 | End: 2022-06-02

## 2022-05-16 RX ORDER — CYCLOBENZAPRINE HCL 5 MG
10 TABLET ORAL NIGHTLY PRN
Qty: 20 TABLET | Refills: 0 | Status: SHIPPED | OUTPATIENT
Start: 2022-05-16

## 2022-05-16 NOTE — PROGRESS NOTES
"Chief Complaint  Knee Pain (Fell on steps of bleachers Saturday at a graduation/)    Subjective          Khushboo Freeman presents to [facility]  History of Present Illness    Patient is a patient of Dr. Nicolle Galvez.  This is my first time seeing this patient.  She has complaint of bilateral knee pain since falling Saturday.  Left is worse than right.     Also has a blister from taking her hose off for first aid on her left foot.          Objective   Vital Signs:  /68   Pulse 78   Temp 97.3 °F (36.3 °C)   Ht 149.9 cm (59\")   Wt 63.1 kg (139 lb 3.2 oz)   SpO2 99%   BMI 28.11 kg/m²           Physical Exam  Constitutional:       Appearance: Normal appearance.   Musculoskeletal:      Right knee: Normal range of motion. Tenderness present over the medial joint line and lateral joint line.      Left knee: Normal range of motion. Tenderness present over the medial joint line and lateral joint line.   Skin:     Findings: Abrasion (noted to bilateral knees, no signs of infection, slight bruising and tender to palpation.   ) and wound (blister noted to left great toe.  no signs of infection) present.   Neurological:      Mental Status: She is alert and oriented to person, place, and time.   Psychiatric:         Mood and Affect: Mood normal.         Behavior: Behavior normal.         Thought Content: Thought content normal.         Judgment: Judgment normal.        Result Review :                 Assessment and Plan    Diagnoses and all orders for this visit:    1. Fall (on) (from) unspecified stairs and steps, sequela (Primary)  -     XR Knee 1 or 2 View Bilateral; Future    2. Acute pain of both knees  -     XR Knee 1 or 2 View Bilateral; Future    3. Abrasion of skin    Other orders  -     meloxicam (Mobic) 15 MG tablet; Take 1 tablet by mouth Daily. For inflammation  Dispense: 20 tablet; Refill: 0  -     cyclobenzaprine (FLEXERIL) 5 MG tablet; Take 2 tablets by mouth At Night " As Needed for Muscle Spasms.  Dispense: 20 tablet; Refill: 0      Will order meloxicam and flexeril.  Discussed possible skin infection and how/when to report.  Nothing noted currently.   Will order xrays for further evaluation.  If worsening pain, notify provider.           Follow Up   No follow-ups on file.  Patient was given instructions and counseling regarding her condition or for health maintenance advice. Please see specific information pulled into the AVS if appropriate.

## 2022-06-02 ENCOUNTER — OFFICE VISIT (OUTPATIENT)
Dept: FAMILY MEDICINE CLINIC | Facility: CLINIC | Age: 57
End: 2022-06-02

## 2022-06-02 VITALS
BODY MASS INDEX: 26.82 KG/M2 | HEART RATE: 77 BPM | DIASTOLIC BLOOD PRESSURE: 70 MMHG | TEMPERATURE: 97.8 F | WEIGHT: 136.6 LBS | HEIGHT: 60 IN | OXYGEN SATURATION: 99 % | SYSTOLIC BLOOD PRESSURE: 104 MMHG

## 2022-06-02 DIAGNOSIS — N39.0 URINARY TRACT INFECTION WITH HEMATURIA, SITE UNSPECIFIED: Primary | ICD-10-CM

## 2022-06-02 DIAGNOSIS — R31.9 URINARY TRACT INFECTION WITH HEMATURIA, SITE UNSPECIFIED: Primary | ICD-10-CM

## 2022-06-02 DIAGNOSIS — R35.0 URINE FREQUENCY: ICD-10-CM

## 2022-06-02 LAB
BILIRUB BLD-MCNC: ABNORMAL MG/DL
CLARITY, POC: CLEAR
COLOR UR: YELLOW
EXPIRATION DATE: ABNORMAL
EXPIRATION DATE: NORMAL
GLUCOSE UR STRIP-MCNC: NEGATIVE MG/DL
HBA1C MFR BLD: 5.6 %
KETONES UR QL: NEGATIVE
LEUKOCYTE EST, POC: ABNORMAL
Lab: ABNORMAL
Lab: NORMAL
NITRITE UR-MCNC: NEGATIVE MG/ML
PH UR: 6 [PH] (ref 5–8)
PROT UR STRIP-MCNC: ABNORMAL MG/DL
RBC # UR STRIP: NEGATIVE /UL
SP GR UR: 1.02 (ref 1–1.03)
UROBILINOGEN UR QL: NORMAL

## 2022-06-02 PROCEDURE — 99213 OFFICE O/P EST LOW 20 MIN: CPT | Performed by: FAMILY MEDICINE

## 2022-06-02 PROCEDURE — 3044F HG A1C LEVEL LT 7.0%: CPT | Performed by: FAMILY MEDICINE

## 2022-06-02 PROCEDURE — 83036 HEMOGLOBIN GLYCOSYLATED A1C: CPT | Performed by: FAMILY MEDICINE

## 2022-06-02 RX ORDER — NITROFURANTOIN 25; 75 MG/1; MG/1
100 CAPSULE ORAL 2 TIMES DAILY
Qty: 10 CAPSULE | Refills: 0 | Status: SHIPPED | OUTPATIENT
Start: 2022-06-02

## 2022-06-02 NOTE — PROGRESS NOTES
"Chief Complaint  Urinary Tract Infection (Frequency 10 + daily ) and Diarrhea    Subjective        Khushboo Freeman presents to [facility]     History of Present Illness  Patient is here today with a new problem.  She states that she is having urinary frequency for the past few weeks.  She denies any urinary urgency or dysuria. although she has been experiencing more urine output as well as darker urine. The patient has a history of UTI but reports her current symptoms are unlike her past UTI symptoms. She has discontinued drinking caffeine, but her symptoms remain.     Patient is also been having some intermittent diarrhea, noting her stools change from soft to liquid, and bowel frequency for 1 month.  She denies any abdominal pain, nausea, or vomiting.  She denies any fever or chills.  Patient also denies any blood in her stools.  Patient also denies any recent travel abroad.    Objective   Vital Signs:  /70   Pulse 77   Temp 97.8 °F (36.6 °C)   Ht 151.1 cm (59.5\")   Wt 62 kg (136 lb 9.6 oz)   SpO2 99%   BMI 27.13 kg/m²           Physical Exam  Vitals and nursing note reviewed.   Constitutional:       Appearance: Normal appearance. She is well-developed and normal weight.   HENT:      Head: Normocephalic and atraumatic.   Eyes:      General: No scleral icterus.     Conjunctiva/sclera: Conjunctivae normal.   Cardiovascular:      Rate and Rhythm: Normal rate and regular rhythm.      Heart sounds: Normal heart sounds.   Pulmonary:      Effort: Pulmonary effort is normal.      Breath sounds: Normal breath sounds.   Abdominal:      General: Bowel sounds are normal. There is no distension.      Palpations: Abdomen is soft. There is no mass.      Tenderness: There is no abdominal tenderness. There is no right CVA tenderness, left CVA tenderness, guarding or rebound.      Hernia: No hernia is present.   Musculoskeletal:         General: Normal range of motion.      Cervical " back: Normal range of motion and neck supple.      Right lower leg: No edema.      Left lower leg: No edema.   Skin:     General: Skin is warm and dry.      Capillary Refill: Capillary refill takes less than 2 seconds.      Findings: No rash.   Neurological:      Mental Status: She is alert and oriented to person, place, and time.   Psychiatric:         Mood and Affect: Mood normal.         Behavior: Behavior normal.         Thought Content: Thought content normal.         Judgment: Judgment normal.        Result Review :  The following data was reviewed by: Nicolle Galvez DO on 06/02/2022:  Common labs    Common Labsle 8/24/21 8/24/21 6/2/22    1253 1253    Glucose 91     BUN 11     Creatinine 0.60     eGFR Non  Am 102     eGFR African Am 117     Sodium 141     Potassium 4.0     Chloride 105     Calcium 9.3     Total Protein 7.1     Albumin 4.3     Total Bilirubin 0.5     Alkaline Phosphatase 115     AST (SGOT) 39     ALT (SGPT) 31     WBC  4.8    Hemoglobin  14.7    Hematocrit  44.3    Platelets  213    Hemoglobin A1C   5.6      Comments are available for some flowsheets but are not being displayed.           UA    Urinalysis 8/24/21 6/2/22   Ketones, UA Negative Negative   Leukocytes, UA Negative Small (1+) (A)   (A) Abnormal value                      Assessment and Plan   Diagnoses and all orders for this visit:  Diagnoses and all orders for this visit:    1. Urinary tract infection with hematuria, site unspecified (Primary)    2. Urine frequency  -     POCT urinalysis dipstick, automated  -     Urine Culture - Urine, Urine, Clean Catch; Future  -     Urine Culture - Urine, Urine, Clean Catch  -     POC Glycosylated Hemoglobin (Hb A1C)  -     nitrofurantoin, macrocrystal-monohydrate, (Macrobid) 100 MG capsule; Take 1 capsule by mouth 2 (Two) Times a Day.  Dispense: 10 capsule; Refill: 0      1. Urine frequency (Primary)  - Her UA today shows signs of infection.   - I have ordered Macrobid for 5  days.  -     POCT urinalysis dipstick, automated  -     Urine Culture - Urine, Urine, Clean Catch; Future  -     Urine Culture - Urine, Urine, Clean Catch  -     nitrofurantoin, macrocrystal-monohydrate, (Macrobid) 100 MG capsule; Take 1 capsule by mouth 2 (Two) Times a Day.  Dispense: 10 capsule; Refill: 0  -     POC Glycosylated Hemoglobin (Hb A1C)-A1c was 5.9 which is in normal range and therefore unlikely to be because of urinary frequency.  Reassurance given.  Patient was encouraged to follow-up if symptoms persist after antibiotics.         Follow Up   No follow-ups on file.  Patient was given instructions and counseling regarding her condition or for health maintenance advice. Please see specific information pulled into the AVS if appropriate.       Transcribed from ambient dictation for Nicolle Galvez DO by Eleanor Ordonez.  06/02/22   12:49 EDT    Patient verbalized consent to the visit recording.      Nicolle Galvez DO

## 2022-06-04 LAB
BACTERIA UR CULT: NORMAL
BACTERIA UR CULT: NORMAL

## 2022-06-10 ENCOUNTER — TELEPHONE (OUTPATIENT)
Dept: FAMILY MEDICINE CLINIC | Facility: CLINIC | Age: 57
End: 2022-06-10

## 2022-06-10 NOTE — TELEPHONE ENCOUNTER
Provider: RJ NGUYEN    Caller: ADRIÁN MIGUEL    Relationship to Patient: SELF    Pharmacy: [facility] #[id number] - [address] [place] [address] - [phone]  - [phone] FX    Phone Number: [phone] (H)    Reason for Call: PATIENT IS GOING OUT OF TOWN TO BE WITH HER MOTHER. SHE IS VERY ALLERGIC TO CATS AND HER MOTHER HAS ONE. SHE IS CONCERNED ABOUT BEING THERE WITH THE CAT. PATIENT IS WONDERING WHAT SHE CAN DO FOR HER ALLERGIES WHILE SHE IS THERE.     PATIENT IS LEAVING ON Sunday 06/12/22

## 2022-06-10 NOTE — TELEPHONE ENCOUNTER
She can try taking some Zyrtec or Allegra over-the-counter in the mornings with some Benadryl to take at night if needed.  Also I would recommend taking Rhinocort or Nasacort every day.  In general, I would recommend avoiding staying in a house where allergies are going to be triggered.

## 2022-07-21 ENCOUNTER — TELEPHONE (OUTPATIENT)
Dept: FAMILY MEDICINE CLINIC | Facility: CLINIC | Age: 57
End: 2022-07-21

## 2022-07-21 NOTE — TELEPHONE ENCOUNTER
Caller: Khushboo Freeman    Relationship: Self    Best call back number: [phone]    What medication are you requesting: UTI  What are your current symptoms: LOWER BACK PAIN, FREQUENT URINATION ,BURNING WHEN URINATING     How long have you been experiencing symptoms: 4 DAYS  Have you had these symptoms before:    [x] Yes  [] No    Have you been treated for these symptoms before:   [x] Yes  [] No    If a prescription is needed, what is your preferred pharmacy and phone number: [facility] #[id number] - [place], [place] - [address] AT [address] [facility] - [phone]  - [phone] FX     Additional notes: PATIENT WAS TREATED FOR THIS MULTIPLE TIMES SAYS IT FEELS LIKE EVERY OTHER TIME SHE'S HAD ONE

## 2022-07-21 NOTE — TELEPHONE ENCOUNTER
Unfortunately by law the patient must be in [place] in order for me to bill for a video visit.  Therefore, I think the patient should be seen at a local urgent care center.  It is very important for them to obtain a urine specimen before treatment.

## 2022-07-21 NOTE — TELEPHONE ENCOUNTER
Called pt to advise her that she would have to be seen for this, she stated that she is in [place], [place] with her mother.  She stated it is the same pain as she experienced back in June and she was treated with Macrobid.  She her symptoms from June went away but are back now.  I advised pt it may be possible to do a video visit because she is out of state.  Please advise

## 2022-09-16 ENCOUNTER — TELEPHONE (OUTPATIENT)
Dept: FAMILY MEDICINE CLINIC | Facility: CLINIC | Age: 57
End: 2022-09-16

## 2022-09-16 NOTE — TELEPHONE ENCOUNTER
Caller: Khushboo Freeman    Relationship to patient: Self    Best call back number: [phone]    Patient is needing: PATIENT IS CALLING IN BECAUSE [facility] NEEDS A COPY OF THE MEDICATION THAT SHE'S CURRENTLY ALLERGIC TO PLEASE ADVISE  [phone] AS SOON AS POSSIBLE

## 2023-03-20 ENCOUNTER — HOSPITAL ENCOUNTER (EMERGENCY)
Facility: HOSPITAL | Age: 58
Discharge: HOME | End: 2023-03-20
Attending: EMERGENCY MEDICINE | Admitting: EMERGENCY MEDICINE
Payer: COMMERCIAL

## 2023-03-20 ENCOUNTER — APPOINTMENT (OUTPATIENT)
Dept: GENERAL RADIOLOGY | Facility: HOSPITAL | Age: 58
End: 2023-03-20
Payer: COMMERCIAL

## 2023-03-20 VITALS
BODY MASS INDEX: 27.82 KG/M2 | OXYGEN SATURATION: 98 % | WEIGHT: 138 LBS | HEIGHT: 59 IN | HEART RATE: 78 BPM | SYSTOLIC BLOOD PRESSURE: 130 MMHG | TEMPERATURE: 98.7 F | RESPIRATION RATE: 17 BRPM | DIASTOLIC BLOOD PRESSURE: 80 MMHG

## 2023-03-20 DIAGNOSIS — S86.811A STRAIN OF RIGHT CALF MUSCLE: Primary | ICD-10-CM

## 2023-03-20 DIAGNOSIS — M79.604 PAIN OF RIGHT LOWER EXTREMITY: ICD-10-CM

## 2023-03-20 PROCEDURE — 73630 X-RAY EXAM OF FOOT: CPT

## 2023-03-20 PROCEDURE — 99283 EMERGENCY DEPT VISIT LOW MDM: CPT

## 2023-03-20 PROCEDURE — 73590 X-RAY EXAM OF LOWER LEG: CPT

## 2023-03-20 PROCEDURE — 73610 X-RAY EXAM OF ANKLE: CPT

## 2023-03-20 RX ORDER — IBUPROFEN 800 MG/1
800 TABLET ORAL ONCE
Status: COMPLETED | OUTPATIENT
Start: 2023-03-20 | End: 2023-03-20

## 2023-03-20 RX ADMIN — IBUPROFEN 800 MG: 800 TABLET, FILM COATED ORAL at 21:33

## 2023-03-21 NOTE — ED PROVIDER NOTES
EMERGENCY DEPARTMENT ENCOUNTER    Room Number:  T02/02  Date seen:  3/21/2023  PCP: Nicolle Galvez DO      HPI:  Chief Complaint: Right lower extremity injury  A complete HPI/ROS/PMH/PSH/SH/FH are unobtainable due to: Nothing  Context: Khushboo Freeman is a 57 y.o. female who presents to the ED c/o right lower extremity injury that occurred that occurred J PTA.  Patient states she was attempting to play volleyball.  She went to make a plan on the ball and her right foot remained planted.  This caused medial lateral forces on the right lower extremity.  Since the time of the injury she has had significant pain in the right calf.  This is exacerbated by movement, touch, weightbearing.  She does state she slightly sprained her right ankle.  She denies sustaining any other injury in the process.  She is here for further evaluation.          PAST MEDICAL HISTORY  Active Ambulatory Problems     Diagnosis Date Noted   • Intraductal papilloma of breast, right 10/09/2020     Resolved Ambulatory Problems     Diagnosis Date Noted   • Anemia 06/09/2016   • Arterial blood pressure decreased 06/09/2016   • Itch 06/09/2016   • Breast mass, right 10/28/2020     Past Medical History:   Diagnosis Date   • Fatty liver    • Fecal incontinence    • Gastritis    • Hematoma of breast    • History of transfusion    • Kidney infection 2013   • Low blood pressure    • Metatarsal fracture    • PONV (postoperative nausea and vomiting)    • Seasonal allergies    • Uterine fibroid          PAST SURGICAL HISTORY  Past Surgical History:   Procedure Laterality Date   • BREAST LUMPECTOMY Right 1/14/2021    Procedure: Excision of mass of the right breast with preop needle localization;  Surgeon: Keturah Hendrickson DO;  Location: [facility];  Service: General;  Laterality: Right;  BREAST LUMPECTOMY   • CHOLECYSTECTOMY     • COLONOSCOPY  2016    normal per pt   • COLONOSCOPY N/A 3/1/2017    [facility]   • FOOT SURGERY Left 11/18/2020   • HYSTERECTOMY   07/2009   • RECTAL SURGERY  07/2016    Had a tear of rectum.  Dr. Lee         FAMILY HISTORY  Family History   Problem Relation Age of Onset   • Hypertension Mother    • Thyroid disease Mother    • Osteoporosis Mother    • Cancer Father         kidney   • Colon cancer Paternal Uncle    • Malig Hyperthermia Neg Hx          SOCIAL HISTORY  Social History     Socioeconomic History   • Marital status:    • Number of children: 3   Tobacco Use   • Smoking status: Never   • Smokeless tobacco: Never   Vaping Use   • Vaping Use: Never used   Substance and Sexual Activity   • Alcohol use: No   • Drug use: No   • Sexual activity: Defer     Partners: Male     Birth control/protection: None         ALLERGIES  Penicillins, Clindamycin/lincomycin, Lactose intolerance (gi), Ultram [tramadol hcl], Oxycodone-acetaminophen, Prednisone, and Strawberry        REVIEW OF SYSTEMS  Review of Systems     All systems reviewed and negative except for those discussed in HPI.       PHYSICAL EXAM  ED Triage Vitals   Temp Heart Rate Resp BP SpO2   03/20/23 2013 03/20/23 2013 03/20/23 2013 03/20/23 2031 03/20/23 2013   98.7 °F (37.1 °C) 87 18 131/79 97 %      Temp src Heart Rate Source Patient Position BP Location FiO2 (%)   03/20/23 2013 03/20/23 2013 -- -- --   Tympanic Monitor          Physical Exam      GENERAL: WDWN female, not distressed, does appear slightly uncomfortable  HENT: nares patent  EYES: no scleral icterus  CV: regular rhythm, normal rate, DP and PT pulses +2 bilateral lower extremity  RESPIRATORY: normal effort  ABDOMEN: soft  MUSCULOSKELETAL: Right lower extremity: Mild TTP over the lateral malleolus.  Significant TTP over the right calf.  Right knee: No effusion, no tenderness, no laxity with varus/valgus force.  Compartments of the right lower extremity remain soft.  NEURO: alert, moves all extremities, follows commands  PSYCH:  calm, cooperative  SKIN: warm, dry    Vital signs and nursing notes  reviewed.          LAB RESULTS  No results found for this or any previous visit (from the past 24 hour(s)).    Ordered the above labs and reviewed the results.        RADIOLOGY  XR Tibia Fibula 2 View Right, XR Ankle 3+ View Right    Result Date: 3/20/2023  2 VIEWS RIGHT TIBIA AND FIBULA; 3 VIEWS RIGHT ANKLE; 3 VIEWS RIGHT FOOT  HISTORY: Pain  COMPARISON: None available.  FINDINGS: Right tibia and fibula: No acute fracture or subluxation of right tibia or fibula is seen. No aggressive osseous abnormality identified.  Right ankle: No acute fracture or subluxation of the right ankle is seen. Ankle mortise appears intact.  Right foot: No acute fracture or subluxation are seen there is some degenerative changes, most significant at the metatarsophalangeal joint of the great toe.      No acute osseous findings.  This report was finalized on 3/20/2023 9:06 PM by Dr. Sharonda Arredondo M.D.        Ordered the above noted radiological studies. Reviewed by me in PACS.          PROCEDURES  Splint - Cast - Strapping    Date/Time: 3/21/2023 8:48 PM  Performed by: Ernesto Newsome III, PA  Authorized by: Alisha Nicholson MD     Consent:     Consent obtained:  Verbal    Consent given by:  Patient    Risks discussed:  Pain and numbness  Universal protocol:     Patient identity confirmed:  Verbally with patient  Pre-procedure details:     Distal neurologic exam:  Normal    Distal perfusion: distal pulses strong    Procedure details:     Location:  Leg    Leg location:  R lower leg    Cast type:  Short leg    Supplies:  Cotton padding, elastic bandage and fiberglass    Attestation: Splint applied and adjusted personally by me    Post-procedure details:     Distal neurologic exam:  Normal    Distal perfusion: distal pulses strong      Procedure completion:  Tolerated well, no immediate complications              MEDICATIONS GIVEN IN ER  Medications   ibuprofen (ADVIL,MOTRIN) tablet 800 mg (800 mg Oral Given 3/20/23 2133)          MEDICAL DECISION MAKING, PROGRESS, and CONSULTS    Discussion below represents my analysis of pertinent findings related to patient's condition, differential diagnosis, treatment plan and final disposition.      Orders placed during this visit:  Orders Placed This Encounter   Procedures   • Splint Cast Strapping   • [facility].  Crutches   •  Crutches   • XR Tibia Fibula 2 View Right   • XR Ankle 3+ View Right   • XR Foot 3+ View Right   • Undress & Gown (If Required to Visualize Injury)   • Elevate Extremity   • Apply Ice to Affected Area         Additional sources:  - Discussed/obtained information from independent historians: None available additional information was obtained to confirm the patient's history.    - External (non-ED) record review: Patient was seen by a Dr. Galvez on 6/2/2022 for UTI with hematuria.  Patient was placed on Macrobid for 5 days and closely follow-up.  A hemoglobin A1c was also checked at this visit which turned out to be 5.9.  Patient had no other complaints at this visit.         - Chronic or social conditions impacting care: None      Differential diagnosis:    Calf strain, gastrocnemius strain, proximal Achilles injury, right lateral malleolus fracture, Maisonneuve fracture, knee sprain.  Knee is unremarkable on exam.  Will obtain x-rays of the ankle, foot, tib-fib to further evaluate.    After initial evaluation, I am suspicious that this is a significant calf strain.            PPE: The patient wore a mask throughout the entire encounter. I wore a well-fitting mask.    DIAGNOSIS  Final diagnoses:   Strain of right calf muscle   Pain of right lower extremity         DISPOSITION  DISCHARGE    Patient discharged in stable condition.    Reviewed implications of results, diagnosis, meds, responsibility to follow up, warning signs and symptoms of possible worsening, potential complications and reasons to return to ER.    Patient/Family voiced understanding of  above instructions.    Discussed plan for discharge, as there is no emergent indication for admission. Patient referred to primary care provider for BP management due to today's BP. Pt/family is agreeable and understands need for follow up and repeat testing.  Pt is aware that discharge does not mean that nothing is wrong but it indicates no emergency is present that requires admission and they must continue care with follow-up as given below or physician of their choice.     FOLLOW-UP  Terry Gama MD  [address] [facility] [address] [phone]    Schedule an appointment as soon as possible for a visit   For further evaluation and treatment         Medication List      New Prescriptions    diclofenac 50 MG EC tablet  Commonly known as: VOLTAREN  Take 1 tablet by mouth 3 (Three) Times a Day.        Changed    cetirizine 10 MG tablet  Commonly known as: zyrTEC  Take 1 tablet by mouth Daily.  What changed:   · when to take this  · reasons to take this           Where to Get Your Medications      These medications were sent to [facility] #[id number] - [address] AT [address] [place] [address] - [phone]  - [phone] [facility] [place] [address]    Phone: [phone]   · diclofenac 50 MG EC tablet           Latest Documented Vital Signs:  As of 20:49 EDT  BP- 130/80 HR- 78 Temp- 98.7 °F (37.1 °C) (Tympanic) O2 sat- 98%      --    Please note that portions of this were completed with a voice recognition program.       Note Disclaimer: At [facility], we believe that sharing information builds trust and better relationships. You are receiving this note because you are receiving care at [facility] or recently visited. It is possible you will see health information before a provider has talked with you about it. This kind of information can be easy to misunderstand. To help you fully understand what it means for your health, we urge you to discuss  this note with your provider.       Ernesto Newsome III, PA  03/21/23 2042

## 2023-03-21 NOTE — ED TRIAGE NOTES
Pt c/o right foot and lower leg pain that started 1845 avani. Pt was at work playing volleyball when she twisted her legand fell. Denies hitting head.     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

## 2023-06-12 ENCOUNTER — OFFICE VISIT (OUTPATIENT)
Dept: FAMILY MEDICINE CLINIC | Facility: CLINIC | Age: 58
End: 2023-06-12
Payer: COMMERCIAL

## 2023-06-12 VITALS
OXYGEN SATURATION: 100 % | TEMPERATURE: 98.5 F | DIASTOLIC BLOOD PRESSURE: 75 MMHG | SYSTOLIC BLOOD PRESSURE: 118 MMHG | BODY MASS INDEX: 26.61 KG/M2 | HEART RATE: 54 BPM | WEIGHT: 132 LBS | HEIGHT: 59 IN

## 2023-06-12 DIAGNOSIS — J10.1 INFLUENZA A: Primary | ICD-10-CM

## 2023-06-12 DIAGNOSIS — R05.9 COUGH, UNSPECIFIED TYPE: ICD-10-CM

## 2023-06-12 LAB
EXPIRATION DATE: ABNORMAL
EXPIRATION DATE: NORMAL
FLUAV AG NPH QL: POSITIVE
FLUBV AG NPH QL: NEGATIVE
INTERNAL CONTROL: ABNORMAL
INTERNAL CONTROL: NORMAL
Lab: ABNORMAL
Lab: NORMAL
SARS-COV-2 AG UPPER RESP QL IA.RAPID: NOT DETECTED

## 2023-06-12 RX ORDER — PROMETHAZINE HYDROCHLORIDE AND CODEINE PHOSPHATE 6.25; 1 MG/5ML; MG/5ML
5 SYRUP ORAL EVERY 4 HOURS PRN
COMMUNITY

## 2023-06-12 RX ORDER — BENZONATATE 200 MG/1
200 CAPSULE ORAL 3 TIMES DAILY PRN
Qty: 30 CAPSULE | Refills: 0 | Status: SHIPPED | OUTPATIENT
Start: 2023-06-12

## 2023-06-12 NOTE — PROGRESS NOTES
"Chief Complaint  Cough, Fever, and Ear Drainage (Teeth hurt )    Subjective        Khushboo Freeman presents to [facility]  History of Present Illness  Patient is here today for some new symptoms.  She started with cough, fever, sore throat, head congestion, runny nose and sneezing about 4 days ago.  She has also been fatigued.  The patient has performed 2 COVID tests which were both negative.    Objective   Vital Signs:  /75   Pulse 54   Temp 98.5 °F (36.9 °C)   Ht 149.9 cm (59\")   Wt 59.9 kg (132 lb)   SpO2 100%   BMI 26.66 kg/m²   Estimated body mass index is 26.66 kg/m² as calculated from the following:    Height as of this encounter: 149.9 cm (59\").    Weight as of this encounter: 59.9 kg (132 lb).             Physical Exam  Vitals and nursing note reviewed.   Constitutional:       Appearance: Normal appearance. She is well-developed and normal weight.   HENT:      Head: Normocephalic and atraumatic.      Right Ear: External ear normal.      Left Ear: External ear normal.      Nose: Nose normal.   Eyes:      General: No scleral icterus.     Conjunctiva/sclera: Conjunctivae normal.   Cardiovascular:      Rate and Rhythm: Normal rate and regular rhythm.      Heart sounds: Normal heart sounds.   Pulmonary:      Effort: Pulmonary effort is normal.      Breath sounds: Normal breath sounds.   Musculoskeletal:      Cervical back: Normal range of motion and neck supple.   Lymphadenopathy:      Cervical: No cervical adenopathy.   Skin:     General: Skin is warm and dry.      Findings: No rash.   Neurological:      Mental Status: She is alert and oriented to person, place, and time.   Psychiatric:         Mood and Affect: Mood normal.         Behavior: Behavior normal.         Thought Content: Thought content normal.         Judgment: Judgment normal.      Result Review :  The following data was reviewed by: Nicolle Galvez DO on 06/12/2023:  Influenza A&B          " 6/12/2023    14:36   Common Labsle   Rapid Influenza A Ag Positive    Rapid Influenza B Ag Negative                     Assessment and Plan   Diagnoses and all orders for this visit:    1. Influenza A (Primary)  -     benzonatate (TESSALON) 200 MG capsule; Take 1 capsule by mouth 3 (Three) Times a Day As Needed for Cough.  Dispense: 30 capsule; Refill: 0    2. Cough, unspecified type  -     POCT SARS-CoV-2 Antigen GRACE  -     POC Influenza A / B    Patient is here today for a new problem of influenza A.  In office influenza a test was positive, in office influenza B and COVID tests were negative.  Patient was advised to drink plenty of fluids and rest.  Patient was given the above prescription medications to help with the symptoms.  Patient was advised to monitor symptoms closely and follow-up or seek more urgent medical care if worsening.  Also advised to continue wearing a mask until they are feeling better.          Follow Up   Return if symptoms worsen or fail to improve.  Patient was given instructions and counseling regarding her condition or for health maintenance advice. Please see specific information pulled into the AVS if appropriate.

## 2023-11-03 ENCOUNTER — OFFICE VISIT (OUTPATIENT)
Dept: FAMILY MEDICINE CLINIC | Facility: CLINIC | Age: 58
End: 2023-11-03
Payer: COMMERCIAL

## 2023-11-03 VITALS
OXYGEN SATURATION: 99 % | HEART RATE: 79 BPM | DIASTOLIC BLOOD PRESSURE: 59 MMHG | HEIGHT: 59 IN | TEMPERATURE: 97.1 F | SYSTOLIC BLOOD PRESSURE: 108 MMHG | WEIGHT: 135.4 LBS | BODY MASS INDEX: 27.3 KG/M2

## 2023-11-03 DIAGNOSIS — J06.9 VIRAL UPPER RESPIRATORY TRACT INFECTION: ICD-10-CM

## 2023-11-03 DIAGNOSIS — R05.9 COUGH, UNSPECIFIED TYPE: Primary | ICD-10-CM

## 2023-11-03 LAB
EXPIRATION DATE: NORMAL
EXPIRATION DATE: NORMAL
FLUAV AG NPH QL: NEGATIVE
FLUBV AG NPH QL: NEGATIVE
INTERNAL CONTROL: NORMAL
INTERNAL CONTROL: NORMAL
Lab: NORMAL
Lab: NORMAL
SARS-COV-2 AG UPPER RESP QL IA.RAPID: NOT DETECTED

## 2023-11-03 RX ORDER — BENZONATATE 200 MG/1
200 CAPSULE ORAL 3 TIMES DAILY PRN
Qty: 30 CAPSULE | Refills: 0 | Status: SHIPPED | OUTPATIENT
Start: 2023-11-03

## 2023-11-03 RX ORDER — DEXTROMETHORPHAN HYDROBROMIDE AND PROMETHAZINE HYDROCHLORIDE 15; 6.25 MG/5ML; MG/5ML
5 SYRUP ORAL NIGHTLY PRN
Qty: 118 ML | Refills: 0 | Status: SHIPPED | OUTPATIENT
Start: 2023-11-03

## 2023-11-03 NOTE — PROGRESS NOTES
"Chief Complaint  Fatigue, Fever, and Cough    Subjective        Khushboo Freeman presents to [facility]  History of Present Illness  Patient is here today with a new problem.  She started about 10 days ago with fatigue and body aches.  However, about 2 or 3 days ago she noticed she lost her sense of taste.  And then yesterday she started with a sore throat and congestion and headaches.  No known ill contacts.      Objective   Vital Signs:  /59   Pulse 79   Temp 97.1 °F (36.2 °C)   Ht 149.9 cm (59\")   Wt 61.4 kg (135 lb 6.4 oz)   SpO2 99%   BMI 27.35 kg/m²   Estimated body mass index is 27.35 kg/m² as calculated from the following:    Height as of this encounter: 149.9 cm (59\").    Weight as of this encounter: 61.4 kg (135 lb 6.4 oz).             Physical Exam  Vitals and nursing note reviewed.   Constitutional:       Appearance: Normal appearance. She is well-developed.   HENT:      Head: Normocephalic and atraumatic.      Right Ear: External ear normal.      Left Ear: External ear normal.      Nose: Nose normal.   Eyes:      General: No scleral icterus.     Conjunctiva/sclera: Conjunctivae normal.   Cardiovascular:      Rate and Rhythm: Normal rate and regular rhythm.      Heart sounds: Normal heart sounds.   Pulmonary:      Effort: Pulmonary effort is normal.      Breath sounds: Normal breath sounds.   Musculoskeletal:      Cervical back: Normal range of motion and neck supple.   Skin:     General: Skin is warm and dry.      Findings: No rash.   Neurological:      Mental Status: She is alert and oriented to person, place, and time.   Psychiatric:         Mood and Affect: Mood normal.         Behavior: Behavior normal.         Thought Content: Thought content normal.         Judgment: Judgment normal.        Result Review :  The following data was reviewed by: Nicolle Galvez DO on 11/03/2023:  Influenza A&B          11/3/2023    10:51   Common Labsle   Rapid " Influenza A Ag Negative    Rapid Influenza B Ag Negative                     Assessment and Plan   Diagnoses and all orders for this visit:    1. Cough, unspecified type (Primary)  -     POC Influenza A / B  -     POCT SARS-CoV-2 Antigen GRACE    2. Viral upper respiratory tract infection  -     benzonatate (TESSALON) 200 MG capsule; Take 1 capsule by mouth 3 (Three) Times a Day As Needed for Cough.  Dispense: 30 capsule; Refill: 0  -     promethazine-dextromethorphan (PROMETHAZINE-DM) 6.25-15 MG/5ML syrup; Take 5 mL by mouth At Night As Needed for Cough.  Dispense: 118 mL; Refill: 0    Patient is here today for an acute upper respiratory infection.  In office influenza A, influenza B, and COVID test are all negative.  Patient was advised to drink plenty of fluids and rest.  Patient was given the above prescription medications to help with the symptoms.  Patient was advised to monitor symptoms closely and follow-up or seek more urgent medical care if worsening.  Also advised to continue wearing a mask until they are feeling better.          Follow Up   Return if symptoms worsen or fail to improve.  Patient was given instructions and counseling regarding her condition or for health maintenance advice. Please see specific information pulled into the AVS if appropriate.

## 2023-11-06 ENCOUNTER — TELEMEDICINE (OUTPATIENT)
Dept: FAMILY MEDICINE CLINIC | Facility: CLINIC | Age: 58
End: 2023-11-06
Payer: COMMERCIAL

## 2023-11-06 DIAGNOSIS — J06.9 VIRAL UPPER RESPIRATORY TRACT INFECTION: Primary | ICD-10-CM

## 2023-11-06 PROCEDURE — 99213 OFFICE O/P EST LOW 20 MIN: CPT | Performed by: FAMILY MEDICINE

## 2023-11-06 NOTE — PROGRESS NOTES
"Chief Complaint  Sore Throat and Cough    Subjective         Khushboo Freeman presents to [facility]  History of Present Illness  Patient has requested a video visit because she is having worsening URI symptoms.  She is having increased ST, swollen glands and cough.  She denies any EDUARDO or chest pain.  Patient would like a note to remain off of work until next week since her symptoms are not improving.    Objective   Vital Signs:   There were no vitals taken for this visit.    Estimated body mass index is 27.35 kg/m² as calculated from the following:    Height as of 11/3/23: 149.9 cm (59\").    Weight as of 11/3/23: 61.4 kg (135 lb 6.4 oz).     Physical Exam   Constitutional: She appears well-developed and well-nourished.   HENT:   Head: Normocephalic and atraumatic.   Pulmonary/Chest: Effort normal.   Neurological: She is alert.     Result Review :                 Assessment and Plan    Diagnoses and all orders for this visit:    1. Viral upper respiratory tract infection (Primary)    Patient requested a video visit for persistent symptoms of a URI that was likely COVID.  Patient still without red flags.  She requires a note for work if she is to remain off because her symptoms are not improving after 5 days.  I have given her a work excuse to remain off until November 13, 2023.  Patient was advised to follow-up or seek more urgent medical care if her symptoms worsen.  I spent 20 minutes caring for Khushboo on this date of service. This time includes time spent by me in the following activities:performing a medically appropriate examination and/or evaluation , counseling and educating the patient/family/caregiver, and documenting information in the medical record  Follow Up   No follow-ups on file.  Patient was given instructions and counseling regarding her condition or for health maintenance advice. Please see specific information pulled into the AVS if appropriate.     Mode of " Visit: Video  Location of patient: home  [place] of provider[place] [facility] [place] [facility]  You have chosen to receive care through a telehealth visit.  Does the patient consent to use a video/audio connection for your medical care today? Yes  The visit included audio and video interaction. No technical issues occurred during this visit.

## 2023-11-06 NOTE — LETTER
November 6, 2023     Patient: Khushboo Freeman   YOB: 1965   Date of Visit: 11/6/2023       To Whom It May Concern:    It is my medical opinion that Khushboo Freeman may return to work on 11/13/2023.            Sincerely,        Nicolle Galvez DO    CC: No Recipients

## 2023-11-08 ENCOUNTER — TELEPHONE (OUTPATIENT)
Dept: FAMILY MEDICINE CLINIC | Facility: CLINIC | Age: 58
End: 2023-11-08

## 2023-11-08 ENCOUNTER — TELEMEDICINE (OUTPATIENT)
Dept: FAMILY MEDICINE CLINIC | Facility: CLINIC | Age: 58
End: 2023-11-08
Payer: COMMERCIAL

## 2023-11-08 DIAGNOSIS — H10.9 CONJUNCTIVITIS OF BOTH EYES, UNSPECIFIED CONJUNCTIVITIS TYPE: Primary | ICD-10-CM

## 2023-11-08 PROCEDURE — 99213 OFFICE O/P EST LOW 20 MIN: CPT | Performed by: FAMILY MEDICINE

## 2023-11-08 RX ORDER — ERYTHROMYCIN 5 MG/G
OINTMENT OPHTHALMIC NIGHTLY
Qty: 1 G | Refills: 0 | Status: SHIPPED | OUTPATIENT
Start: 2023-11-08 | End: 2023-11-10

## 2023-11-08 NOTE — TELEPHONE ENCOUNTER
Caller: Khushboo Freeman    Relationship: Self    Best call back number:     What medication are you requesting:     What are your current symptoms: EYES PINK WITH DRAINAGE    How long have you been experiencing symptoms: 1 DAY    Have you had these symptoms before:    [] Yes  [x] No    Have you been treated for these symptoms before:   [] Yes  [x] No    If a prescription is needed, what is your preferred pharmacy and phone number:  [facility]  [address] [place] [address]  [phone]    Additional notes: PATIENT SAYS SHE HAS PINK EYE AND IS CALLING IN TO SEE IF DR NGUYEN WILL CALL HER IN MEDICATION TO HELP.

## 2023-11-08 NOTE — PROGRESS NOTES
"Chief Complaint  Conjunctivitis (WITH DRAINAGE  / COVID POSITIVE )    Subjective         Khushboo Freeman presents to [facility]  History of Present Illness  Patient requested a video visit today to discuss some new symptoms she is having.  She was diagnosed with an upper respiratory infection likely due to COVID last week.  However, over the last 2 days she has had irritation of her conjunctive a bilaterally.  She denies any eye pain or visual problems.  She denies any shortness of breath or chest pain.  She states that her cough is actually improving some today.    Objective   Vital Signs:   There were no vitals taken for this visit.    Estimated body mass index is 27.35 kg/m² as calculated from the following:    Height as of 11/3/23: 149.9 cm (59\").    Weight as of 11/3/23: 61.4 kg (135 lb 6.4 oz).     Physical Exam   Constitutional: She appears well-developed and well-nourished.   HENT:   Head: Normocephalic and atraumatic.   Eyes:   Bilateral conjunctive a are slightly injected but no purulent drainage is noted.   Pulmonary/Chest: Effort normal.   Neurological: She is alert.   Psychiatric: She has a normal mood and affect.     Result Review :                 Assessment and Plan    Diagnoses and all orders for this visit:    1. Conjunctivitis of both eyes, unspecified conjunctivitis type (Primary)  -     erythromycin (ROMYCIN) 5 MG/GM ophthalmic ointment; Administer  to both eyes Every Night.  Dispense: 1 g; Refill: 0    Patient has requested a video visit today for conjunctivitis of both eyes.  Patient was given the above antibiotic ointment to use at bedtime.  She was advised to use rewetting drops during the day.  If her symptoms worsen or her vision becomes affected she should follow-up immediately or seek more urgent medical care.  I spent 20 minutes caring for Khushboo on this date of service. This time includes time spent by me in the following activities:performing a " medically appropriate examination and/or evaluation , counseling and educating the patient/family/caregiver, ordering medications, tests, or procedures, and documenting information in the medical record  Follow Up   No follow-ups on file.  Patient was given instructions and counseling regarding her condition or for health maintenance advice. Please see specific information pulled into the AVS if appropriate.     Mode of Visit: Video  Location of patient: home  Location of provider: [facility] [place] [facility]  You have chosen to receive care through a telehealth visit.  The patient has signed the video visit consent form.  The visit included audio and video interaction. No technical issues occurred during this visit.

## 2023-11-09 ENCOUNTER — TELEPHONE (OUTPATIENT)
Dept: FAMILY MEDICINE CLINIC | Facility: CLINIC | Age: 58
End: 2023-11-09

## 2023-11-09 NOTE — TELEPHONE ENCOUNTER
Caller: Khushboo Freeman    Relationship: Self    Best call back number: [phone]    What medication are you requesting: SOMETHING FOR CONJUNCTIVITIS      Have you had these symptoms before:    [x] Yes  [] No    Have you been treated for these symptoms before:   [x] Yes  [] No    If a prescription is needed, what is your preferred pharmacy and phone number: [id number] - [place], [place] - [address] AT [address] [facility] - [phone]  - [phone]      Additional notes:  PATIENT CALLED TO LET DR NGUYEN KNOW THAT PATIENT IS ALLERGIC TO erythromycin (ROMYCIN) 5 MG/GM ophthalmic ointment  AND NEEDS A NEW MEDICATION CALLED INTO [facility].    PATIENT REQUESTING TO HAVE DR NGUYEN REACH OUT TO [facility] TO GET A LIST OF HER MEDICATION ALLERGIES, PLEASE ADD IT TO HER CHART NOTES    PLEASE ADVISE WHEN SENT TO PHARMACY.

## 2023-11-10 ENCOUNTER — OFFICE VISIT (OUTPATIENT)
Dept: FAMILY MEDICINE CLINIC | Facility: CLINIC | Age: 58
End: 2023-11-10
Payer: COMMERCIAL

## 2023-11-10 VITALS
HEIGHT: 59 IN | BODY MASS INDEX: 26.93 KG/M2 | HEART RATE: 88 BPM | TEMPERATURE: 98.6 F | SYSTOLIC BLOOD PRESSURE: 132 MMHG | DIASTOLIC BLOOD PRESSURE: 68 MMHG | WEIGHT: 133.6 LBS | OXYGEN SATURATION: 96 %

## 2023-11-10 DIAGNOSIS — H10.9 CONJUNCTIVITIS OF BOTH EYES, UNSPECIFIED CONJUNCTIVITIS TYPE: Primary | ICD-10-CM

## 2023-11-10 DIAGNOSIS — J06.9 VIRAL UPPER RESPIRATORY TRACT INFECTION: ICD-10-CM

## 2023-11-10 DIAGNOSIS — J01.00 ACUTE MAXILLARY SINUSITIS, RECURRENCE NOT SPECIFIED: ICD-10-CM

## 2023-11-10 PROCEDURE — 99213 OFFICE O/P EST LOW 20 MIN: CPT | Performed by: NURSE PRACTITIONER

## 2023-11-10 RX ORDER — HYDROCODONE POLISTIREX AND CHLORPHENIRAMINE POLISTIREX 10; 8 MG/5ML; MG/5ML
5 SUSPENSION, EXTENDED RELEASE ORAL EVERY 12 HOURS PRN
Qty: 120 ML | Refills: 0 | Status: SHIPPED | OUTPATIENT
Start: 2023-11-10

## 2023-11-10 RX ORDER — CIPROFLOXACIN HYDROCHLORIDE 3.5 MG/ML
1 SOLUTION/ DROPS TOPICAL 4 TIMES DAILY
Qty: 5 ML | Refills: 0 | Status: SHIPPED | OUTPATIENT
Start: 2023-11-10

## 2023-11-10 RX ORDER — DOXYCYCLINE 100 MG/1
100 CAPSULE ORAL 2 TIMES DAILY
Qty: 20 CAPSULE | Refills: 0 | Status: SHIPPED | OUTPATIENT
Start: 2023-11-10

## 2023-11-10 NOTE — PROGRESS NOTES
"Chief Complaint  Conjunctivitis, Exposure To Known Illness (Covid positive 10 days ago), Chills, Swollen Glands, and Cough    Subjective        Khushboo Freeman presents to [facility]  History of Present Illness    Patient is here today with complaint of sickness.  She was seen on 11/2 with her PCP, DR. Galvez.  She was diagnosed with URI and told she thought she had a variant of the covid even tho negative.  She has had many days of fever, loss of voice, conjuctivitis that she is still dealing with   Havingteeth and ear pain  Upper back pain  Diarrhea   Shortness of breath  Hard to swallow, weakness of legs, ongoing cough  Lost of taste, smell  Speaking causing ongoing cough    Reports tessalon and erythromycin she is allergic to    Took all of phenergan dm.  She states it didn't help much        Objective   Vital Signs:  /68   Pulse 88   Temp 98.6 °F (37 °C)   Ht 149.9 cm (59\")   Wt 60.6 kg (133 lb 9.6 oz)   SpO2 96%   BMI 26.98 kg/m²   Estimated body mass index is 26.98 kg/m² as calculated from the following:    Height as of this encounter: 149.9 cm (59\").    Weight as of this encounter: 60.6 kg (133 lb 9.6 oz).             Physical Exam  Constitutional:       Appearance: Normal appearance.   HENT:      Head: Normocephalic and atraumatic.      Right Ear: Tympanic membrane has decreased mobility.      Left Ear: Tympanic membrane has decreased mobility.      Nose: Rhinorrhea present.      Right Sinus: Maxillary sinus tenderness present. No frontal sinus tenderness.      Left Sinus: Maxillary sinus tenderness present. No frontal sinus tenderness.      Mouth/Throat:      Lips: Pink.      Mouth: Mucous membranes are moist.   Eyes:      Conjunctiva/sclera:      Right eye: Right conjunctiva is injected. Exudate present.      Left eye: Left conjunctiva is injected. Exudate present.   Cardiovascular:      Rate and Rhythm: Normal rate and regular rhythm.      Pulses: Normal " pulses.   Pulmonary:      Effort: Pulmonary effort is normal.      Breath sounds: Normal breath sounds.   Skin:     General: Skin is warm and dry.   Neurological:      Mental Status: She is alert and oriented to person, place, and time.   Psychiatric:         Mood and Affect: Mood normal.         Behavior: Behavior normal.         Thought Content: Thought content normal.         Judgment: Judgment normal.        Result Review :                   Assessment and Plan   Diagnoses and all orders for this visit:    1. Conjunctivitis of both eyes, unspecified conjunctivitis type (Primary)    2. Viral upper respiratory tract infection  -     Hydrocod Shyam-Chlorphe Shyam ER (TUSSIONEX PENNKINETIC) 10-8 MG/5ML ER suspension; Take 5 mL by mouth Every 12 (Twelve) Hours As Needed for Cough.  Dispense: 120 mL; Refill: 0    3. Acute maxillary sinusitis, recurrence not specified  -     Hydrocod Shyam-Chlorphe Shyam ER (TUSSIONEX PENNKINETIC) 10-8 MG/5ML ER suspension; Take 5 mL by mouth Every 12 (Twelve) Hours As Needed for Cough.  Dispense: 120 mL; Refill: 0    Other orders  -     doxycycline (MONODOX) 100 MG capsule; Take 1 capsule by mouth 2 (Two) Times a Day.  Dispense: 20 capsule; Refill: 0  -     ciprofloxacin (Ciloxan) 0.3 % ophthalmic solution; Apply 1 drop to eye(s) as directed by provider 4 (Four) Times a Day.  Dispense: 5 mL; Refill: 0    We will treat for acute sinusitis symptoms.  Antibiotic given    We will give ciprofloxacin eyedrops for pinkeye.    Tussionex given as needed for cough since she cannot tolerate the other 2 and got no relief with Phenergan DM.    I discussed with patient the importance of increasing water intake is much as possible and resting as much as possible.     If any continued issues notify provider.           Follow Up   No follow-ups on file.  Patient was given instructions and counseling regarding her condition or for health maintenance advice. Please see specific information pulled into the  AVS if appropriate.

## 2023-11-10 NOTE — LETTER
November 10, 2023     Patient: Khushboo Freeman   YOB: 1965   Date of Visit: 11/10/2023       To Whom It May Concern:    Patient was seen in office by provider on 11/3, 11/6, 11/8, and 11/10 for ongoing issues with acute illness.  Please feel free to contact me with any questions or concerns.           Sincerely,        FIDENCIO Madden    CC: No Recipients

## 2023-12-05 ENCOUNTER — OFFICE VISIT (OUTPATIENT)
Dept: FAMILY MEDICINE CLINIC | Facility: CLINIC | Age: 58
End: 2023-12-05
Payer: COMMERCIAL

## 2023-12-05 VITALS
BODY MASS INDEX: 27.17 KG/M2 | DIASTOLIC BLOOD PRESSURE: 61 MMHG | WEIGHT: 134.8 LBS | OXYGEN SATURATION: 95 % | HEART RATE: 67 BPM | HEIGHT: 59 IN | SYSTOLIC BLOOD PRESSURE: 110 MMHG

## 2023-12-05 DIAGNOSIS — Z20.1 CONTACT WITH AND (SUSPECTED) EXPOSURE TO TUBERCULOSIS: Primary | ICD-10-CM

## 2023-12-05 NOTE — PROGRESS NOTES
"Chief Complaint  possible exposure to TB and Follow-up    Subjective        Khushboo Freeman presents to [facility]  History of Present Illness  Patient is here today with a new concern.  Yesterday she may have been exposed to a student who is being evaluated further for TB.  The student had a cough and had coughed up some blood.  The student was in a mask and the patient was also wearing a mask during the 5 minutes that she was interpreting for her.  The patient has not had any symptoms since the exposure yesterday.  Patient has not had the BCG vaccine.   The student in question will be seen by a physician today as well.      Objective   Vital Signs:  /61   Pulse 67   Ht 149.9 cm (59\")   Wt 61.1 kg (134 lb 12.8 oz)   SpO2 95%   BMI 27.23 kg/m²   Estimated body mass index is 27.23 kg/m² as calculated from the following:    Height as of this encounter: 149.9 cm (59\").    Weight as of this encounter: 61.1 kg (134 lb 12.8 oz).             Physical Exam  Vitals and nursing note reviewed.   Constitutional:       Appearance: Normal appearance. She is well-developed.   HENT:      Head: Normocephalic and atraumatic.   Eyes:      General: No scleral icterus.     Conjunctiva/sclera: Conjunctivae normal.   Cardiovascular:      Rate and Rhythm: Normal rate and regular rhythm.      Heart sounds: Normal heart sounds.   Pulmonary:      Effort: Pulmonary effort is normal.      Breath sounds: Normal breath sounds.   Musculoskeletal:         General: Normal range of motion.      Cervical back: Normal range of motion and neck supple.   Skin:     General: Skin is warm and dry.      Capillary Refill: Capillary refill takes less than 2 seconds.      Findings: No rash.   Neurological:      Mental Status: She is alert and oriented to person, place, and time.   Psychiatric:         Mood and Affect: Mood normal.         Behavior: Behavior normal.         Thought Content: Thought content normal.  "        Judgment: Judgment normal.        Result Review :                   Assessment and Plan   Diagnoses and all orders for this visit:    1. Contact with and (suspected) exposure to tuberculosis (Primary)  -     QuantiFERON TB Gold    Patient is here to discuss exposure to possible TB.   The student that she was exposed to is also being seen by the doctor today.  She will remain in contact with the family regarding that students test results.   I have ordered the QuantiFERON TB Gold test and have advised the patient that if exposed to yesterday it may be too soon to pick it up on the test today.  That patient preferred to go ahead and be tested but understood that she may need to be retested in the future.  Patient was advised to return to the office if she develops any symptoms of TB.         Follow Up   Return for Annual physical.  Patient was given instructions and counseling regarding her condition or for health maintenance advice. Please see specific information pulled into the AVS if appropriate.         Answers submitted by the patient for this visit:  Primary Reason for Visit (Submitted on 12/4/2023)  What is the primary reason for your visit?: Physical

## 2023-12-08 LAB
GAMMA INTERFERON BACKGROUND BLD IA-ACNC: 0.42 IU/ML
M TB IFN-G BLD-IMP: NEGATIVE
M TB IFN-G CD4+ T-CELLS BLD-ACNC: 0.16 IU/ML
M TBIFN-G CD4+ CD8+T-CELLS BLD-ACNC: 0.11 IU/ML
MITOGEN IGNF BLD-ACNC: >10 IU/ML
QUANTIFERON INCUBATION: NORMAL
SERVICE CMNT-IMP: NORMAL

## 2024-01-31 ENCOUNTER — OFFICE VISIT (OUTPATIENT)
Dept: FAMILY MEDICINE CLINIC | Facility: CLINIC | Age: 59
End: 2024-01-31
Payer: COMMERCIAL

## 2024-01-31 VITALS
TEMPERATURE: 98.3 F | BODY MASS INDEX: 27.21 KG/M2 | OXYGEN SATURATION: 99 % | WEIGHT: 135 LBS | HEART RATE: 75 BPM | DIASTOLIC BLOOD PRESSURE: 66 MMHG | HEIGHT: 59 IN | SYSTOLIC BLOOD PRESSURE: 108 MMHG

## 2024-01-31 DIAGNOSIS — R30.0 DYSURIA: Primary | ICD-10-CM

## 2024-01-31 DIAGNOSIS — M79.10 MUSCLE PAIN: ICD-10-CM

## 2024-01-31 DIAGNOSIS — Z86.2 HISTORY OF ANEMIA: ICD-10-CM

## 2024-01-31 LAB
BILIRUB BLD-MCNC: ABNORMAL MG/DL
CLARITY, POC: CLEAR
COLOR UR: YELLOW
EXPIRATION DATE: ABNORMAL
GLUCOSE UR STRIP-MCNC: NEGATIVE MG/DL
KETONES UR QL: NEGATIVE
LEUKOCYTE EST, POC: ABNORMAL
Lab: ABNORMAL
NITRITE UR-MCNC: NEGATIVE MG/ML
PH UR: 6 [PH] (ref 5–8)
PROT UR STRIP-MCNC: ABNORMAL MG/DL
RBC # UR STRIP: NEGATIVE /UL
SP GR UR: 1.03 (ref 1–1.03)
UROBILINOGEN UR QL: NORMAL

## 2024-01-31 NOTE — PROGRESS NOTES
"Chief Complaint  Muscle Pain and Upper Extremity Issue    Subjective        Khushboo Freeman presents to [facility]  History of Present Illness      Patient is a patient of Dr. Nicolle Galvez. She reports pain for a long time in the inside of her thighs that she feels like she has been sitting on a horse and they feel sore and tense.  Worse in the morning.      She presents today with complaint of bilateral arm pain from elbow to hand for last 2 weeks. Slept weird and is from wrist to shoulder.  Started with right and then went to  left hand.  Started with wrist, then up to elbow and up to all of arm.    Ibuprofen 800mg.  Denies numbness and tingling in bilateral arms.      Worried about iron because she used to take that but doesn't take it anymore. Was advised to stop due to not being anemic.  Hasn't had labs since 2021        Also has a complaint of frequency of urination    Objective   Vital Signs:  /66   Pulse 75   Temp 98.3 °F (36.8 °C)   Ht 149.9 cm (59\")   Wt 61.2 kg (135 lb)   SpO2 99%   BMI 27.27 kg/m²   Estimated body mass index is 27.27 kg/m² as calculated from the following:    Height as of this encounter: 149.9 cm (59\").    Weight as of this encounter: 61.2 kg (135 lb).             Physical Exam  Constitutional:       Appearance: Normal appearance.   Cardiovascular:      Rate and Rhythm: Normal rate and regular rhythm.      Pulses: Normal pulses.   Pulmonary:      Effort: Pulmonary effort is normal.      Breath sounds: Normal breath sounds.   Skin:     General: Skin is warm and dry.   Neurological:      Mental Status: She is alert.   Psychiatric:         Mood and Affect: Mood is anxious.         Behavior: Behavior normal.         Thought Content: Thought content normal.         Judgment: Judgment normal.        Result Review :                     Assessment and Plan     Diagnoses and all orders for this visit:    1. Dysuria (Primary)  -     POCT urinalysis " dipstick, automated  -     Urine Culture - Urine, Urine, Clean Catch; Future  -     Urine Culture - Urine, Urine, Clean Catch    2. Muscle pain  -     CBC & Differential  -     Comprehensive Metabolic Panel  -     TSH  -     Vitamin B12  -     Vitamin D,25-Hydroxy    3. History of anemia  -     CBC & Differential  -     Comprehensive Metabolic Panel  -     TSH  -     Vitamin B12  -     Vitamin D,25-Hydroxy      Patient did have slight leukocytosis and UA.  Because of multidrug-allergies and not severe symptoms we decided to wait on culture urine.  Will call with results and any changes needed plan of care.    Patient muscle pain and arm seems related to a biceps tendinitis.  However she is very concerned because she is also having some generalized pain in legs as well.  We will obtain labs and call with results any changes needed plan of care.  She also is worried because she has a history of anemia.  I am checking this on labs today as well.  Will call with results any changes needed to plan of care.    As I was leaving the room the patient asked about worries of black mold.  I instructed her to call health department for further evaluation.  I reassured patient that I do not believe she is having any symptoms specifically from this because she reports no respiratory symptoms.         Follow Up     No follow-ups on file.  Patient was given instructions and counseling regarding her condition or for health maintenance advice. Please see specific information pulled into the AVS if appropriate.

## 2024-02-01 LAB
25(OH)D3+25(OH)D2 SERPL-MCNC: 11.4 NG/ML (ref 30–100)
ALBUMIN SERPL-MCNC: 4.6 G/DL (ref 3.8–4.9)
ALBUMIN/GLOB SERPL: 1.4 {RATIO} (ref 1.2–2.2)
ALP SERPL-CCNC: 151 IU/L (ref 44–121)
ALT SERPL-CCNC: 32 IU/L (ref 0–32)
AST SERPL-CCNC: 43 IU/L (ref 0–40)
BASOPHILS # BLD AUTO: 0 X10E3/UL (ref 0–0.2)
BASOPHILS NFR BLD AUTO: 0 %
BILIRUB SERPL-MCNC: 0.5 MG/DL (ref 0–1.2)
BUN SERPL-MCNC: 15 MG/DL (ref 8–27)
BUN/CREAT SERPL: 26 (ref 12–28)
CALCIUM SERPL-MCNC: 9.3 MG/DL (ref 8.7–10.3)
CHLORIDE SERPL-SCNC: 102 MMOL/L (ref 96–106)
CO2 SERPL-SCNC: 24 MMOL/L (ref 20–29)
CREAT SERPL-MCNC: 0.58 MG/DL (ref 0.57–1)
EGFRCR SERPLBLD CKD-EPI 2021: 104 ML/MIN/1.73
EOSINOPHIL # BLD AUTO: 0.1 X10E3/UL (ref 0–0.4)
EOSINOPHIL NFR BLD AUTO: 2 %
ERYTHROCYTE [DISTWIDTH] IN BLOOD BY AUTOMATED COUNT: 12.2 % (ref 11.7–15.4)
GLOBULIN SER CALC-MCNC: 3.2 G/DL (ref 1.5–4.5)
GLUCOSE SERPL-MCNC: 112 MG/DL (ref 70–99)
HCT VFR BLD AUTO: 42.6 % (ref 34–46.6)
HGB BLD-MCNC: 14.2 G/DL (ref 11.1–15.9)
IMM GRANULOCYTES # BLD AUTO: 0 X10E3/UL (ref 0–0.1)
IMM GRANULOCYTES NFR BLD AUTO: 0 %
LYMPHOCYTES # BLD AUTO: 1.7 X10E3/UL (ref 0.7–3.1)
LYMPHOCYTES NFR BLD AUTO: 35 %
MCH RBC QN AUTO: 30.8 PG (ref 26.6–33)
MCHC RBC AUTO-ENTMCNC: 33.3 G/DL (ref 31.5–35.7)
MCV RBC AUTO: 92 FL (ref 79–97)
MONOCYTES # BLD AUTO: 0.3 X10E3/UL (ref 0.1–0.9)
MONOCYTES NFR BLD AUTO: 6 %
NEUTROPHILS # BLD AUTO: 2.8 X10E3/UL (ref 1.4–7)
NEUTROPHILS NFR BLD AUTO: 57 %
PLATELET # BLD AUTO: 223 X10E3/UL (ref 150–450)
POTASSIUM SERPL-SCNC: 3.8 MMOL/L (ref 3.5–5.2)
PROT SERPL-MCNC: 7.8 G/DL (ref 6–8.5)
RBC # BLD AUTO: 4.61 X10E6/UL (ref 3.77–5.28)
SODIUM SERPL-SCNC: 141 MMOL/L (ref 134–144)
TSH SERPL DL<=0.005 MIU/L-ACNC: 2.95 UIU/ML (ref 0.45–4.5)
VIT B12 SERPL-MCNC: 470 PG/ML (ref 232–1245)
WBC # BLD AUTO: 4.9 X10E3/UL (ref 3.4–10.8)

## 2024-02-01 NOTE — PROGRESS NOTES
Can you add on hemoglobin A1c due to elevated glucose and we will call with results of all labs together?

## 2024-02-02 LAB
BACTERIA UR CULT: NORMAL
BACTERIA UR CULT: NORMAL
HBA1C MFR BLD: 5.8 % (ref 4.8–5.6)
WRITTEN AUTHORIZATION: NORMAL

## 2024-02-02 RX ORDER — ERGOCALCIFEROL 1.25 MG/1
50000 CAPSULE ORAL WEEKLY
Qty: 5 CAPSULE | Refills: 5 | Status: SHIPPED | OUTPATIENT
Start: 2024-02-02

## 2024-02-02 RX ORDER — MELOXICAM 7.5 MG/1
7.5 TABLET ORAL DAILY
Qty: 30 TABLET | Refills: 0 | Status: SHIPPED | OUTPATIENT
Start: 2024-02-02

## 2024-02-02 NOTE — PROGRESS NOTES
SPOKE TO PATIENT AWARE OF RESULTS / PATIENT WOULD LIKE SOMETHING STRONGER FOR INFLAMMATION. [facility]

## 2024-02-02 NOTE — PROGRESS NOTES
Please call patient with results of labs.  Patient's white blood cell count was within normal limits and she is not anemic.  Her glucose was elevated so I added on a hemoglobin A1c.  This shows she is in the prediabetic range.  She needs to work hard on decreasing carbs and sugars in diet.  This should be rechecked in the next 3 to 6 months.  Vitamin D is very low.  I am going to send in supplement.  All other labs are stable.I do believe the pain in her arms is due to biceps tendonitis.  Would she like me to send in a stronger medication for inflammation than ibuprofen?  If so, be sure she doesn't take any additional ibuprofen or aleve with this.

## 2024-03-04 RX ORDER — MELOXICAM 7.5 MG/1
7.5 TABLET ORAL DAILY
Qty: 30 TABLET | Refills: 0 | Status: SHIPPED | OUTPATIENT
Start: 2024-03-04

## 2024-03-12 ENCOUNTER — TELEPHONE (OUTPATIENT)
Dept: FAMILY MEDICINE CLINIC | Facility: CLINIC | Age: 59
End: 2024-03-12

## 2024-03-12 ENCOUNTER — OFFICE VISIT (OUTPATIENT)
Dept: FAMILY MEDICINE CLINIC | Facility: CLINIC | Age: 59
End: 2024-03-12
Payer: COMMERCIAL

## 2024-03-12 VITALS
TEMPERATURE: 100 F | HEIGHT: 59 IN | SYSTOLIC BLOOD PRESSURE: 100 MMHG | OXYGEN SATURATION: 97 % | WEIGHT: 134 LBS | BODY MASS INDEX: 27.01 KG/M2 | DIASTOLIC BLOOD PRESSURE: 60 MMHG | RESPIRATION RATE: 20 BRPM | HEART RATE: 84 BPM

## 2024-03-12 DIAGNOSIS — R05.9 COUGH, UNSPECIFIED TYPE: Primary | ICD-10-CM

## 2024-03-12 DIAGNOSIS — J01.00 ACUTE MAXILLARY SINUSITIS, RECURRENCE NOT SPECIFIED: ICD-10-CM

## 2024-03-12 LAB
EXPIRATION DATE: NORMAL
FLUAV AG UPPER RESP QL IA.RAPID: NOT DETECTED
FLUBV AG UPPER RESP QL IA.RAPID: NOT DETECTED
INTERNAL CONTROL: NORMAL
Lab: NORMAL
SARS-COV-2 AG UPPER RESP QL IA.RAPID: NOT DETECTED

## 2024-03-12 NOTE — TELEPHONE ENCOUNTER
Caller: Khushboo Freeman    Relationship: Self    Best call back number: [phone]     What medication are you requesting: ALLERGY MEDICATION    What are your current symptoms: SLIGHT FEVER, SNEEZING, HEADACHE, RUNNY NOSE, PAIN IN EARS, SIDE OF MOUTH, AND TEETH ON LEFT SIDE     How long have you been experiencing symptoms: 4 DAYS     If a prescription is needed, what is your preferred pharmacy and phone number:      [id number] [place] [id number] - [phone] [facility] [phone]      Additional notes: PATIENT STATED THAT SHE USED TO GET A PRESCRIPTION FOR ALLERGY MEDICATION BUT THE PHARMACY NO LONGER HAS THE PRESCRIPTION. PLEASE ADVISE.

## 2024-03-12 NOTE — TELEPHONE ENCOUNTER
Caller: Khushboo Freeman    Relationship: Self    Best call back number: [phone]     What was the call regarding: PATIENT STATED THAT SHE WOULD LIKE TO MAKE SURE THE ALLERGY MEDICATION PRESCRIBED IS NOT A SLEEP MEDICATION SINCE SHE DOES HAVE TO WORK. PLEASE ADVISE.

## 2024-03-12 NOTE — PROGRESS NOTES
"Chief Complaint  low fever and Cough    Subjective        Khushboo Freeman presents to [facility]  Cough        58-year-old female complains of sinus congestion, left ear ache, left-sided teeth pain and a cough productive of clear sputum for few days.  Patient also reports subjective fever, Tmax 98.8.    Patient denies chest pain, shortness of breath, abdominal pain, N/V/D    Patient also complains of perineal pain.  States she had 3 episodes of this pain that resolved spontaneously.  Patient states it feels like as if she fell on her bottom.      Objective   Vital Signs:  /60 (BP Location: Right arm)   Pulse 84   Temp 100 °F (37.8 °C)   Resp 20   Ht 149.9 cm (59\")   Wt 60.8 kg (134 lb)   SpO2 97%   BMI 27.06 kg/m²   Estimated body mass index is 27.06 kg/m² as calculated from the following:    Height as of this encounter: 149.9 cm (59\").    Weight as of this encounter: 60.8 kg (134 lb).             Physical Exam  Constitutional:       Appearance: Normal appearance.   HENT:      Head: Normocephalic and atraumatic.      Right Ear: Tympanic membrane, ear canal and external ear normal.      Left Ear: Tympanic membrane, ear canal and external ear normal.      Nose: Nose normal. Congestion present.      Mouth/Throat:      Mouth: Mucous membranes are moist.      Pharynx: No oropharyngeal exudate or posterior oropharyngeal erythema.   Cardiovascular:      Rate and Rhythm: Normal rate and regular rhythm.      Pulses: Normal pulses.      Heart sounds: Normal heart sounds.   Pulmonary:      Effort: Pulmonary effort is normal.      Breath sounds: Normal breath sounds. No wheezing or rhonchi.   Lymphadenopathy:      Cervical: No cervical adenopathy.   Neurological:      Mental Status: She is alert.        Result Review :                     Assessment and Plan     Diagnoses and all orders for this visit:    1. Cough, unspecified type (Primary)  -     POCT SARS-CoV-2 Antigen GRACE + " Flu    2. Acute maxillary sinusitis, recurrence not specified      Negative COVID and flu test  Start saline rinse and Flonase twice daily and Zyrtec at bedtime  Tylenol for fever above 100.4  Advised patient to follow Dr. Galvez for her pelvic/perineal pain         Follow Up     No follow-ups on file.  Patient was given instructions and counseling regarding her condition or for health maintenance advice. Please see specific information pulled into the AVS if appropriate.

## 2024-03-26 DIAGNOSIS — J06.9 VIRAL UPPER RESPIRATORY TRACT INFECTION: ICD-10-CM

## 2024-03-26 RX ORDER — DEXTROMETHORPHAN HYDROBROMIDE AND PROMETHAZINE HYDROCHLORIDE 15; 6.25 MG/5ML; MG/5ML
5 SYRUP ORAL NIGHTLY PRN
Qty: 118 ML | Refills: 0 | Status: SHIPPED | OUTPATIENT
Start: 2024-03-26

## (undated) DEVICE — SYR LL TP 10ML STRL

## (undated) DEVICE — DRSNG TELFA PAD NONADH STR 1S 3X8IN

## (undated) DEVICE — PENCL E/S ULTRAVAC TELESCP NOSE HOLSTR 10FT

## (undated) DEVICE — DRSNG PAD ABD 8X10IN STRL

## (undated) DEVICE — GLV SURG SENSICARE W/ALOE PF LF 6.5 STRL

## (undated) DEVICE — TRAP FLD MINIVAC MEGADYNE 100ML

## (undated) DEVICE — CANNULA,ADULT,SOFT-TOUCH,7TUBE,SC: Brand: MEDLINE

## (undated) DEVICE — UNDYED MONOFILAMENT (POLYDIOXANONE), ABSORBABLE SYNTHETIC SURGICAL SUTURE: Brand: PDS

## (undated) DEVICE — GAUZE,SPONGE,FLUFF,6"X6.75",STRL,5/TRAY: Brand: MEDLINE

## (undated) DEVICE — ELECTRD NDL EZ CLN MOD 2.75IN

## (undated) DEVICE — 3M™ STERI-STRIP™ REINFORCED ADHESIVE SKIN CLOSURES, R1547, 1/2 IN X 4 IN (12 MM X 100 MM), 6 STRIPS/ENVELOPE: Brand: 3M™ STERI-STRIP™

## (undated) DEVICE — LAG MINOR PROCEDURE: Brand: MEDLINE INDUSTRIES, INC.

## (undated) DEVICE — APPL CHLORAPREP HI/LITE 26ML ORNG

## (undated) DEVICE — TOWEL,OR,DSP,ST,BLUE,STD,4/PK,20PK/CS: Brand: MEDLINE

## (undated) DEVICE — SUT MNCRYL 2/0 SH 27IN Y417H

## (undated) DEVICE — FRCP BIOP RADLJAW4 HOT 2.2X240 BX40

## (undated) DEVICE — Device: Brand: DEFENDO AIR/WATER/SUCTION AND BIOPSY VALVE

## (undated) DEVICE — SUCTION CANISTER, 1000CC,SAFELINER: Brand: DEROYAL

## (undated) DEVICE — THE TORRENT IRRIGATION SCOPE CONNECTOR IS USED WITH THE TORRENT IRRIGATION TUBING TO PROVIDE IRRIGATION FLUIDS SUCH AS STERILE WATER DURING GASTROINTESTINAL ENDOSCOPIC PROCEDURES WHEN USED IN CONJUNCTION WITH AN IRRIGATION PUMP (OR ELECTROSURGICAL UNIT).: Brand: TORRENT

## (undated) DEVICE — TUBING, SUCTION, 1/4" X 10', STRAIGHT: Brand: MEDLINE